# Patient Record
Sex: FEMALE | Race: BLACK OR AFRICAN AMERICAN | NOT HISPANIC OR LATINO | URBAN - METROPOLITAN AREA
[De-identification: names, ages, dates, MRNs, and addresses within clinical notes are randomized per-mention and may not be internally consistent; named-entity substitution may affect disease eponyms.]

---

## 2024-04-10 ENCOUNTER — EMERGENCY (EMERGENCY)
Facility: HOSPITAL | Age: 39
LOS: 1 days | Discharge: HOME | End: 2024-04-10
Admitting: EMERGENCY MEDICINE
Payer: SELF-PAY

## 2024-04-10 VITALS
RESPIRATION RATE: 16 BRPM | HEART RATE: 103 BPM | HEIGHT: 67 IN | DIASTOLIC BLOOD PRESSURE: 77 MMHG | TEMPERATURE: 98 F | WEIGHT: 293 LBS | OXYGEN SATURATION: 94 % | SYSTOLIC BLOOD PRESSURE: 110 MMHG

## 2024-04-10 PROCEDURE — 36600 WITHDRAWAL OF ARTERIAL BLOOD: CPT

## 2024-04-10 PROCEDURE — 99284 EMERGENCY DEPT VISIT MOD MDM: CPT | Mod: 25

## 2024-04-10 PROCEDURE — 99053 MED SERV 10PM-8AM 24 HR FAC: CPT

## 2024-04-10 RX ORDER — SODIUM CHLORIDE 9 MG/ML
1000 INJECTION INTRAMUSCULAR; INTRAVENOUS; SUBCUTANEOUS ONCE
Refills: 0 | Status: DISCONTINUED | OUTPATIENT
Start: 2024-04-10 | End: 2024-04-11

## 2024-04-10 RX ORDER — KETOROLAC TROMETHAMINE 30 MG/ML
15 SYRINGE (ML) INJECTION ONCE
Refills: 0 | Status: DISCONTINUED | OUTPATIENT
Start: 2024-04-10 | End: 2024-04-11

## 2024-04-10 NOTE — ED ADULT TRIAGE NOTE - TEMPERATURE IN FAHRENHEIT (DEGREES F)
INSURANCE HAS DENIED THE LIDOCAINE  PROBABLY DUE TO THE DX  BEFORE I CALL PT IS THERE ANYTHING YOU WANT ME TO TELL HER? 97.8

## 2024-04-10 NOTE — ED ADULT TRIAGE NOTE - CHIEF COMPLAINT QUOTE
Pt walked in c/o sickle cell pain. States pain is generalized, takes Dilaudid 10mg PO last dose 4pm.

## 2024-04-11 VITALS
DIASTOLIC BLOOD PRESSURE: 77 MMHG | SYSTOLIC BLOOD PRESSURE: 100 MMHG | RESPIRATION RATE: 16 BRPM | HEART RATE: 98 BPM | OXYGEN SATURATION: 95 % | TEMPERATURE: 98 F

## 2024-04-11 LAB
ALBUMIN SERPL ELPH-MCNC: 2.7 G/DL — LOW (ref 3.4–5)
ALP SERPL-CCNC: 117 U/L — SIGNIFICANT CHANGE UP (ref 40–120)
ALT FLD-CCNC: 29 U/L — SIGNIFICANT CHANGE UP (ref 12–42)
ANION GAP SERPL CALC-SCNC: 15 MMOL/L — SIGNIFICANT CHANGE UP (ref 9–16)
AST SERPL-CCNC: 29 U/L — SIGNIFICANT CHANGE UP (ref 15–37)
BASOPHILS # BLD AUTO: 0.05 K/UL — SIGNIFICANT CHANGE UP (ref 0–0.2)
BASOPHILS NFR BLD AUTO: 0.5 % — SIGNIFICANT CHANGE UP (ref 0–2)
BILIRUB SERPL-MCNC: 0.3 MG/DL — SIGNIFICANT CHANGE UP (ref 0.2–1.2)
BUN SERPL-MCNC: 30 MG/DL — HIGH (ref 7–23)
CALCIUM SERPL-MCNC: 8.8 MG/DL — SIGNIFICANT CHANGE UP (ref 8.5–10.5)
CHLORIDE SERPL-SCNC: 102 MMOL/L — SIGNIFICANT CHANGE UP (ref 96–108)
CO2 SERPL-SCNC: 20 MMOL/L — LOW (ref 22–31)
CREAT SERPL-MCNC: 2.81 MG/DL — HIGH (ref 0.5–1.3)
EGFR: 21 ML/MIN/1.73M2 — LOW
EOSINOPHIL # BLD AUTO: 0.12 K/UL — SIGNIFICANT CHANGE UP (ref 0–0.5)
EOSINOPHIL NFR BLD AUTO: 1.2 % — SIGNIFICANT CHANGE UP (ref 0–6)
GLUCOSE SERPL-MCNC: 246 MG/DL — HIGH (ref 70–99)
HCT VFR BLD CALC: 30.2 % — LOW (ref 34.5–45)
HGB BLD-MCNC: 9.4 G/DL — LOW (ref 11.5–15.5)
IMM GRANULOCYTES NFR BLD AUTO: 0.4 % — SIGNIFICANT CHANGE UP (ref 0–0.9)
LYMPHOCYTES # BLD AUTO: 2.96 K/UL — SIGNIFICANT CHANGE UP (ref 1–3.3)
LYMPHOCYTES # BLD AUTO: 30.2 % — SIGNIFICANT CHANGE UP (ref 13–44)
MAGNESIUM SERPL-MCNC: 2.1 MG/DL — SIGNIFICANT CHANGE UP (ref 1.6–2.6)
MCHC RBC-ENTMCNC: 27.2 PG — SIGNIFICANT CHANGE UP (ref 27–34)
MCHC RBC-ENTMCNC: 31.1 GM/DL — LOW (ref 32–36)
MCV RBC AUTO: 87.3 FL — SIGNIFICANT CHANGE UP (ref 80–100)
MONOCYTES # BLD AUTO: 0.77 K/UL — SIGNIFICANT CHANGE UP (ref 0–0.9)
MONOCYTES NFR BLD AUTO: 7.9 % — SIGNIFICANT CHANGE UP (ref 2–14)
NEUTROPHILS # BLD AUTO: 5.86 K/UL — SIGNIFICANT CHANGE UP (ref 1.8–7.4)
NEUTROPHILS NFR BLD AUTO: 59.8 % — SIGNIFICANT CHANGE UP (ref 43–77)
NRBC # BLD: 0 /100 WBCS — SIGNIFICANT CHANGE UP (ref 0–0)
PLATELET # BLD AUTO: 402 K/UL — HIGH (ref 150–400)
POTASSIUM SERPL-MCNC: 4.3 MMOL/L — SIGNIFICANT CHANGE UP (ref 3.5–5.3)
POTASSIUM SERPL-SCNC: 4.3 MMOL/L — SIGNIFICANT CHANGE UP (ref 3.5–5.3)
PROT SERPL-MCNC: 8.5 G/DL — HIGH (ref 6.4–8.2)
RBC # BLD: 3.46 M/UL — LOW (ref 3.8–5.2)
RBC # BLD: 3.46 M/UL — LOW (ref 3.8–5.2)
RBC # FLD: 16.1 % — HIGH (ref 10.3–14.5)
RETICS #: 115.2 K/UL — SIGNIFICANT CHANGE UP (ref 25–125)
RETICS/RBC NFR: 3.3 % — HIGH (ref 0.5–2.5)
SICKLE CELL DISEASE SCREENING TEST: NEGATIVE — SIGNIFICANT CHANGE UP
SODIUM SERPL-SCNC: 137 MMOL/L — SIGNIFICANT CHANGE UP (ref 132–145)
WBC # BLD: 9.8 K/UL — SIGNIFICANT CHANGE UP (ref 3.8–10.5)
WBC # FLD AUTO: 9.8 K/UL — SIGNIFICANT CHANGE UP (ref 3.8–10.5)

## 2024-04-11 RX ORDER — HYDROMORPHONE HYDROCHLORIDE 2 MG/ML
4 INJECTION INTRAMUSCULAR; INTRAVENOUS; SUBCUTANEOUS ONCE
Refills: 0 | Status: DISCONTINUED | OUTPATIENT
Start: 2024-04-11 | End: 2024-04-11

## 2024-04-11 RX ADMIN — HYDROMORPHONE HYDROCHLORIDE 4 MILLIGRAM(S): 2 INJECTION INTRAMUSCULAR; INTRAVENOUS; SUBCUTANEOUS at 17:00

## 2024-04-11 RX ADMIN — HYDROMORPHONE HYDROCHLORIDE 4 MILLIGRAM(S): 2 INJECTION INTRAMUSCULAR; INTRAVENOUS; SUBCUTANEOUS at 01:38

## 2024-04-11 NOTE — ED PROVIDER NOTE - PROGRESS NOTE DETAILS
pt falling asleep in stretcher at bedside, NAD or respiratory distress noted. refused to provide urine and have IV placed currently. Labs noted, consistent with pt's baseline, minimally elevated retic count, H/H stable, no indicated for transfusion or acute intervention

## 2024-04-11 NOTE — ED PROCEDURE NOTE - CPROC ED ARTER LINE DETAIL1
Using sterile technique, the correct location was identified, and a needle was inserted into the artery (specify in FT)./Positive blood return was obtained via the catheter./Hemostasis was achieved with direct pressure, and a dry sterile dressing applied.

## 2024-04-11 NOTE — ED PROVIDER NOTE - NSICDXPASTMEDICALHX_GEN_ALL_CORE_FT
PAST MEDICAL HISTORY:  Sickle cell disease      PAST MEDICAL HISTORY:  Anemia     CKD (chronic kidney disease)     Sickle cell disease

## 2024-04-11 NOTE — ED ADULT NURSE NOTE - OBJECTIVE STATEMENT
Pt in ED d/t sickle cell pain. Pt states she takes dilaudid at home, last dosage taken at 4p. C/o of generalized body aches. AOx4, GCS 15.

## 2024-04-11 NOTE — ED PROVIDER NOTE - NSFOLLOWUPINSTRUCTIONS_ED_ALL_ED_FT
Sickle Cell Crisis    AMBULATORY CARE:    A sickle cell crisis is a painful episode that occurs in people who have sickle cell anemia. It happens when sickle-shaped red blood cells (RBCs) block blood vessels. Blood and oxygen cannot get to your tissues, causing pain. A sickle cell crisis can also damage your tissues and cause organ failure, such liver or kidney failure. A sickle cell crisis can become life-threatening.    Common symptoms include the following:    Fever    Pain    Weakness or fatigue    Abdominal pain and swelling    Headaches    A painful, erect penis (priapism)    Fast heartbeats    Shortness of breath  Call your local emergency number (911 in the ) if:    You have shortness of breath or chest pain.    You are a man and have an erection that is painful and does not go away.    You lose vision in one or both eyes.  Seek care immediately if:    You feel like you cannot cope with your pain, or you feel like hurting yourself.    You have behavior changes, a seizure, or faint.    You have a fever.    You have abdominal pain, nausea, or vomiting.    You have a different or worse headache.    You have new weakness or numbness in your arm, leg, or face.    You have new pain in any part of your body.    Your urine is dark and you are urinating less than usual or not at all.    You are dizzy, lightheaded, or faint.  Call your doctor if:    You have any new signs or symptoms.    You have blood in your urine.    You are constipated or you have diarrhea.    You have changes in your vision.    You have increased fatigue.    You plan to travel by airplane or to a high [facility].    You have questions or concerns about your condition or care.  Medical alert identification: Wear medical alert jewelry or carry a card that says you have sickle cell anemia. Ask your healthcare provider where to get these items.  Medical Alert Jewelry    Treatment for a sickle cell crisis may include any of the following:    IV fluids treat dehydration and help reduce sickling of RBCs.    Oxygen helps increase oxygen levels in your blood and make it easier for you to breathe.    A blood transfusion replaces blood with RBCs that are not sickle shaped.    Surgery may be done to remove part of your spleen.  Self-care:    Medicines may be given to decrease sickling of your RBCs. You may also need medicine to prevent a bacterial infection or help you breathe more easily.    Prescription pain medicine may be given. Ask your healthcare provider how to take this medicine safely. Some prescription pain medicines contain acetaminophen. Do not take other medicines that contain acetaminophen without talking to your healthcare provider. Too much acetaminophen may cause liver damage. Prescription pain medicine may cause constipation. Ask your healthcare provider how to prevent or treat constipation.    NSAIDs, such as ibuprofen, help decrease swelling, pain, and fever. This medicine is available with or without a doctor's order. NSAIDs can cause stomach bleeding or kidney problems in certain people. If you take blood thinner medicine, always ask your healthcare provider if NSAIDs are safe for you. Always read the medicine label and follow directions.    Acetaminophen decreases pain and fever. It is available without a doctor's order. Ask how much to take and how often to take it. Follow directions. Read the labels of all other medicines you are using to see if they also contain acetaminophen, or ask your doctor or pharmacist. Acetaminophen can cause liver damage if not taken correctly.  Prevent a sickle cell crisis:    Take vitamins and minerals as directed. Folic acid may help prevent blood vessel problems that can come with sickle cell anemia. Zinc may decrease how often you have pain.    Drink liquids as directed. Dehydration can increase your risk for a sick cell crisis. Ask how much liquid to drink each day and which liquids are best for you.    Balance rest and exercise. Rest during a sickle cell crisis. Over time, increase your activity to a moderate amount. Exercise as directed. Avoid exercise or activities that can cause injury, such as football. Ask about the best exercise plan for you.    Wash your hands frequently. Handwashing can help prevent illness. Wash your hands before you prepare or eat food, and after you use the bathroom.  Handwashing      Avoid quick changes in temperature. Do not go quickly from a warm place to a cold place. Get in a pool slowly instead of jumping in. Avoid getting too hot or too cold. Dress in light clothing in the summer and warm clothing in the winter.    Do not smoke cigarettes or drink alcohol. These increase your risk for a sickle cell crisis. Nicotine and other chemicals in cigarettes and cigars can cause lung damage. Ask your healthcare provider for information if you currently smoke and need help to quit. E-cigarettes or smokeless tobacco still contain nicotine. Talk to your healthcare provider before you use these products.    Ask about vaccines you may need. Vaccines can help prevent a viral infection that may lead to a sickle cell crisis. Get a flu shot as soon as recommended each year, usually in September or October. You may need pneumonia vaccines every 5 years. Your healthcare provider can tell you if you need other vaccines, and when to get them.  Follow up with your doctor as directed: You may need ongoing screening for conditions that can develop because of sickle cell disease. Examples include kidney disease, hypertension (high blood pressure), retinopathy (eye problems), and problems with your lungs. Write down your questions so you remember to ask them during your visits

## 2024-04-11 NOTE — ED PROVIDER NOTE - PATIENT PORTAL LINK FT
You can access the FollowMyHealth Patient Portal offered by [facility] by registering at the following website: [email or web address]. By joining [employer]’s FollowMyHealth portal, you will also be able to view your health information using other applications (apps) compatible with our system.

## 2024-04-11 NOTE — ED PROVIDER NOTE - CARE PROVIDERS DIRECT ADDRESSES
,DirectAddress_Unknown,[email or web address]

## 2024-04-11 NOTE — ED PROVIDER NOTE - PROVIDER TOKENS
FREE:[LAST:[your private hematologist],PHONE:[(   )    -],FAX:[(   )    -]],PROVIDER:[TOKEN:[[id number]]],PROVIDER:[TOKEN:[[id number]]]

## 2024-04-11 NOTE — ED PROVIDER NOTE - OBJECTIVE STATEMENT
39 yo F with past medical history of sickle cell disease, reports taking 8 to 10 mg of Dilaudid p.o. at home every 8 hours as needed for pain, follows at [place] with her private hematologist, here visiting for a  at [facility], baseline H&H , last transfusion was more than 1 year ago, no history of acute chest syndrome/exchange transfusion, presenting complaining of pain in her lower back, hips, and legs.  Patient reports pain consistent with her sickle cell pain, took her p.o. Dilaudid but did not help, no in pain crisis x 2 hours. Denies fever, chills, palpitations, diaphoresis, VELEZ, SOB, orthopnea, cough, hemoptysis, wheezing, peripheral edema, focal weakness, numbness, tingling, paresthesia, HA, dizziness, neck pain, N/V/D/C, abdominal pain, change in urinary/bowel function, trauma, fall, rash, and malaise. 37 yo F with past medical history of sickle cell disease, reports taking 8 to 10 mg of Dilaudid p.o. at home every 8 hours as needed for pain, follows at [place] with her private hematologist, here visiting for a  in [facility], baseline H&H , last transfusion was more than 1 year ago, CKD, baseline Cr unknown, no history of acute chest syndrome/exchange transfusion/AVN, s/p removal of R chest wall port due to line infection 2 yrs ago, presenting complaining of pain in her lower back, hips, and legs.  Patient reports pain consistent with her sickle cell pain, took her p.o. Dilaudid but did not help, now in pain crisis x 2 hours. Denies fever, chills, palpitations, diaphoresis, VELEZ, SOB, orthopnea, cough, hemoptysis, wheezing, peripheral edema, focal weakness, numbness, tingling, paresthesia, HA, dizziness, neck pain, N/V/D/C, abdominal pain, change in urinary/bowel function, trauma, fall, rash, and malaise.

## 2024-04-11 NOTE — ED PROVIDER NOTE - PHYSICAL EXAMINATION
Gen - Obese unkempt F, NAD, sleeping in stretcher, and non-toxic appearing  Skin - warm, dry, intact, multiple scarring of the BUE and chest wall, s/p removal of her port on the R chest wall   HEENT - AT/NC, no nasal discharge, airway patent, neck supple and FROM  CV - S1S2, R/R/R  Resp - CTAB, no r/r/w  GI - soft, ND, NT, no CVAT b/l   MS - w/w/p, no c/c/e, calves supple and NT, No acute or gross deformities noted to extremities. No midline spinal tenderness or step off on palpation  Neuro - AxOx3, no focal neuro deficits, ambulatory without gait disturbance

## 2024-04-11 NOTE — ED PROVIDER NOTE - CLINICAL SUMMARY MEDICAL DECISION MAKING FREE TEXT BOX
39 yo F with past medical history of sickle cell disease, reports taking 8 to 10 mg of Dilaudid p.o. at home every 8 hours as needed for pain, follows at [place] with her private hematologist, [facility], baseline H&H , last transfusion was more than 1 year ago, CKD, baseline Cr unknown, no history of acute chest syndrome/exchange transfusion/AVN, s/p removal of R chest wall port due to line infection 2 yrs ago, presenting complaining of pain in her lower back, hips, and legs x 2 hrs       Based on my personal interpretation of pt's labs/images and entire work up during the ED stay, results, ddx, and f/u plans discussed in length with pt at bedside. AFVSS, pt non-toxic appearing and remained stable throughout the stay after ED interventions. D/c'd home to f/u with hematologist, strict return precautions discussed, prompt return to ED for any worsening or new sx, pt verbalized understanding. 37 yo F with past medical history of sickle cell disease, reports taking 8 to 10 mg of Dilaudid p.o. at home every 8 hours as needed for pain, follows at [place] with her private hematologist, here visiting for a  in [facility], baseline H&H , last transfusion was more than 1 year ago, CKD, baseline Cr unknown, no history of acute chest syndrome/exchange transfusion/AVN, s/p removal of R chest wall port due to line infection 2 yrs ago, presenting complaining of pain in her lower back, hips, and legs x 2 hrs that's typical of her SCC pain   - will obtain medical labs, including retic count and SCC panel, r/o infectious etiology  - pain control and IV hydration and reassess     - pt with difficult peripheral access, s/p chest wall port removal 2 yrs ago, refused IV by RN and provider. blood draw obtained by myself at bedside via Arterial stick. Given po hydration, and IM dilaudid for pain control     - pt observed comfortable throughout ER stay, texting on her phone and sleeping comfortably on stretcher, no O2 requirement, no s/s of acute bacterial infection on exam, sx and labs not suggestive of ACS/other acute vaso-occlusive process/VTE, encouraged continual on hydroxyurea and prompt f/u with private hematologist in [place]. No indication for additional rounds of IV/IM analgesics. Pt has po dilaudid otherwise and refused to provide urine throughout ER stay for additional testing.     Based on my personal interpretation of pt's labs/images and entire work up during the ED stay, results, ddx, and f/u plans discussed in length with pt at bedside. AFVSS, pt non-toxic appearing and remained stable throughout the stay after ED interventions. D/c'd home to f/u with hematologist, strict return precautions discussed, prompt return to ED for any worsening or new sx, pt verbalized understanding.

## 2024-04-11 NOTE — ED PROVIDER NOTE - CARE PROVIDER_API CALL
your private hematologist,   Phone: (   )    -  Fax: (   )    -  Follow Up Time:     Bebe Salvador  Hematology  [address]  [place] [address]  Phone: [phone]  Fax: [phone]  Follow Up Time:     Esme Ferrer  Physical/Rehab Medicine  [address] [place] [address]  Phone: [phone]  Fax: [phone]  Follow Up Time:

## 2024-04-12 ENCOUNTER — EMERGENCY (EMERGENCY)
Facility: HOSPITAL | Age: 39
LOS: 1 days | Discharge: HOME | End: 2024-04-12
Attending: EMERGENCY MEDICINE | Admitting: EMERGENCY MEDICINE
Payer: SELF-PAY

## 2024-04-12 VITALS
OXYGEN SATURATION: 96 % | HEART RATE: 93 BPM | RESPIRATION RATE: 16 BRPM | SYSTOLIC BLOOD PRESSURE: 99 MMHG | TEMPERATURE: 97 F | DIASTOLIC BLOOD PRESSURE: 69 MMHG

## 2024-04-12 VITALS
OXYGEN SATURATION: 98 % | TEMPERATURE: 99 F | HEIGHT: 67 IN | HEART RATE: 96 BPM | DIASTOLIC BLOOD PRESSURE: 93 MMHG | RESPIRATION RATE: 19 BRPM | WEIGHT: 293 LBS | SYSTOLIC BLOOD PRESSURE: 142 MMHG

## 2024-04-12 DIAGNOSIS — E66.9 OBESITY, UNSPECIFIED: ICD-10-CM

## 2024-04-12 DIAGNOSIS — N18.9 CHRONIC KIDNEY DISEASE, UNSPECIFIED: ICD-10-CM

## 2024-04-12 DIAGNOSIS — D57.00 HB-SS DISEASE WITH CRISIS, UNSPECIFIED: ICD-10-CM

## 2024-04-12 DIAGNOSIS — R46.0 VERY LOW LEVEL OF PERSONAL HYGIENE: ICD-10-CM

## 2024-04-12 DIAGNOSIS — M54.50 LOW BACK PAIN, UNSPECIFIED: ICD-10-CM

## 2024-04-12 DIAGNOSIS — Z87.891 PERSONAL HISTORY OF NICOTINE DEPENDENCE: ICD-10-CM

## 2024-04-12 PROBLEM — D64.9 ANEMIA, UNSPECIFIED: Chronic | Status: ACTIVE | Noted: 2024-04-11

## 2024-04-12 PROBLEM — D57.1 SICKLE-CELL DISEASE WITHOUT CRISIS: Chronic | Status: ACTIVE | Noted: 2024-04-11

## 2024-04-12 LAB
ANION GAP SERPL CALC-SCNC: 11 MMOL/L — SIGNIFICANT CHANGE UP (ref 9–16)
BASOPHILS # BLD AUTO: 0.03 K/UL — SIGNIFICANT CHANGE UP (ref 0–0.2)
BASOPHILS NFR BLD AUTO: 0.4 % — SIGNIFICANT CHANGE UP (ref 0–2)
BUN SERPL-MCNC: 31 MG/DL — HIGH (ref 7–23)
CALCIUM SERPL-MCNC: 8.2 MG/DL — LOW (ref 8.5–10.5)
CHLORIDE SERPL-SCNC: 101 MMOL/L — SIGNIFICANT CHANGE UP (ref 96–108)
CO2 SERPL-SCNC: 24 MMOL/L — SIGNIFICANT CHANGE UP (ref 22–31)
CREAT SERPL-MCNC: 2.14 MG/DL — HIGH (ref 0.5–1.3)
EGFR: 30 ML/MIN/1.73M2 — LOW
EOSINOPHIL # BLD AUTO: 0.1 K/UL — SIGNIFICANT CHANGE UP (ref 0–0.5)
EOSINOPHIL NFR BLD AUTO: 1.4 % — SIGNIFICANT CHANGE UP (ref 0–6)
GLUCOSE BLDC GLUCOMTR-MCNC: 311 MG/DL — HIGH (ref 70–99)
GLUCOSE BLDC GLUCOMTR-MCNC: 441 MG/DL — HIGH (ref 70–99)
GLUCOSE SERPL-MCNC: 320 MG/DL — HIGH (ref 70–99)
HCT VFR BLD CALC: 30.9 % — LOW (ref 34.5–45)
HGB BLD-MCNC: 9.6 G/DL — LOW (ref 11.5–15.5)
IMM GRANULOCYTES NFR BLD AUTO: 0.1 % — SIGNIFICANT CHANGE UP (ref 0–0.9)
LYMPHOCYTES # BLD AUTO: 2.76 K/UL — SIGNIFICANT CHANGE UP (ref 1–3.3)
LYMPHOCYTES # BLD AUTO: 37.5 % — SIGNIFICANT CHANGE UP (ref 13–44)
MCHC RBC-ENTMCNC: 26.7 PG — LOW (ref 27–34)
MCHC RBC-ENTMCNC: 31.1 GM/DL — LOW (ref 32–36)
MCV RBC AUTO: 86.1 FL — SIGNIFICANT CHANGE UP (ref 80–100)
MONOCYTES # BLD AUTO: 0.59 K/UL — SIGNIFICANT CHANGE UP (ref 0–0.9)
MONOCYTES NFR BLD AUTO: 8 % — SIGNIFICANT CHANGE UP (ref 2–14)
NEUTROPHILS # BLD AUTO: 3.87 K/UL — SIGNIFICANT CHANGE UP (ref 1.8–7.4)
NEUTROPHILS NFR BLD AUTO: 52.6 % — SIGNIFICANT CHANGE UP (ref 43–77)
NRBC # BLD: 0 /100 WBCS — SIGNIFICANT CHANGE UP (ref 0–0)
PLATELET # BLD AUTO: 382 K/UL — SIGNIFICANT CHANGE UP (ref 150–400)
POTASSIUM SERPL-MCNC: 3.9 MMOL/L — SIGNIFICANT CHANGE UP (ref 3.5–5.3)
POTASSIUM SERPL-SCNC: 3.9 MMOL/L — SIGNIFICANT CHANGE UP (ref 3.5–5.3)
RBC # BLD: 3.59 M/UL — LOW (ref 3.8–5.2)
RBC # BLD: 3.59 M/UL — LOW (ref 3.8–5.2)
RBC # FLD: 16.1 % — HIGH (ref 10.3–14.5)
RETICS #: 131.8 K/UL — HIGH (ref 25–125)
RETICS/RBC NFR: 3.7 % — HIGH (ref 0.5–2.5)
SODIUM SERPL-SCNC: 136 MMOL/L — SIGNIFICANT CHANGE UP (ref 132–145)
WBC # BLD: 7.36 K/UL — SIGNIFICANT CHANGE UP (ref 3.8–10.5)
WBC # FLD AUTO: 7.36 K/UL — SIGNIFICANT CHANGE UP (ref 3.8–10.5)

## 2024-04-12 PROCEDURE — 99284 EMERGENCY DEPT VISIT MOD MDM: CPT

## 2024-04-12 RX ORDER — DIPHENHYDRAMINE HCL 50 MG
25 CAPSULE ORAL ONCE
Refills: 0 | Status: COMPLETED | OUTPATIENT
Start: 2024-04-12 | End: 2024-04-12

## 2024-04-12 RX ORDER — INSULIN HUMAN 100 [IU]/ML
10 INJECTION, SOLUTION SUBCUTANEOUS ONCE
Refills: 0 | Status: COMPLETED | OUTPATIENT
Start: 2024-04-12 | End: 2024-04-12

## 2024-04-12 RX ORDER — INSULIN HUMAN 100 [IU]/ML
4 INJECTION, SOLUTION SUBCUTANEOUS ONCE
Refills: 0 | Status: COMPLETED | OUTPATIENT
Start: 2024-04-12 | End: 2024-04-12

## 2024-04-12 RX ORDER — HYDROMORPHONE HYDROCHLORIDE 2 MG/ML
6 INJECTION INTRAMUSCULAR; INTRAVENOUS; SUBCUTANEOUS ONCE
Refills: 0 | Status: DISCONTINUED | OUTPATIENT
Start: 2024-04-12 | End: 2024-04-12

## 2024-04-12 RX ORDER — HYDROMORPHONE HYDROCHLORIDE 2 MG/ML
8 INJECTION INTRAMUSCULAR; INTRAVENOUS; SUBCUTANEOUS ONCE
Refills: 0 | Status: DISCONTINUED | OUTPATIENT
Start: 2024-04-12 | End: 2024-04-12

## 2024-04-12 RX ADMIN — INSULIN HUMAN 10 UNIT(S): 100 INJECTION, SOLUTION SUBCUTANEOUS at 18:54

## 2024-04-12 RX ADMIN — HYDROMORPHONE HYDROCHLORIDE 6 MILLIGRAM(S): 2 INJECTION INTRAMUSCULAR; INTRAVENOUS; SUBCUTANEOUS at 17:32

## 2024-04-12 RX ADMIN — INSULIN HUMAN 4 UNIT(S): 100 INJECTION, SOLUTION SUBCUTANEOUS at 17:35

## 2024-04-12 RX ADMIN — HYDROMORPHONE HYDROCHLORIDE 8 MILLIGRAM(S): 2 INJECTION INTRAMUSCULAR; INTRAVENOUS; SUBCUTANEOUS at 18:52

## 2024-04-12 RX ADMIN — HYDROMORPHONE HYDROCHLORIDE 6 MILLIGRAM(S): 2 INJECTION INTRAMUSCULAR; INTRAVENOUS; SUBCUTANEOUS at 16:19

## 2024-04-12 RX ADMIN — HYDROMORPHONE HYDROCHLORIDE 6 MILLIGRAM(S): 2 INJECTION INTRAMUSCULAR; INTRAVENOUS; SUBCUTANEOUS at 15:02

## 2024-04-12 RX ADMIN — Medication 25 MILLIGRAM(S): at 15:04

## 2024-04-12 NOTE — ED PROVIDER NOTE - PATIENT PORTAL LINK FT
You can access the FollowMyHealth Patient Portal offered by [facility] by registering at the following website: [email or web address]. By joining [employer]’s FollowMyHealth portal, you will also be able to view your health information using other applications (apps) compatible with our system.

## 2024-04-12 NOTE — ED PROVIDER NOTE - PROGRESS NOTE DETAILS
Patient reports no relief from first dose of dilaudid. Will repeat dose. Pain still severe. Patient requesting 3rd dose but not admission. Also requesting food. Glucose high. Will give insulin with food. Glucose improved. Patient will follow up with her hematologist next week. Return to the ED immediately if getting worse, not improving, or if having any new or troubling symptoms. Glucose 411. WIll give more insulin. I offered US guided IV for hydration. Patient refused. WIll drink water instead. Patient had been drinking soda. Instructed to only drink water. Patient said her glucose goes high when she has a pain crisis.

## 2024-04-12 NOTE — ED PROVIDER NOTE - PHYSICAL EXAMINATION
Gen: Well-developed, well-nourished, NAD, VS as noted by nursing. HEENT: NCAT, mmm   Chest: RRR, nl S1 and S2, no m/r/g. Resp: CTAB, no w/r/r  Abd: nl BS, soft, nt/nd. Ext: Warm, dry. Diffuse tenderness to low back and lower extremities.  Neuro: CN II-XII intact, normal and equal strength, sensation, and reflexes bilaterally, normal gait  Psych: AAOx3

## 2024-04-12 NOTE — ED PROVIDER NOTE - OBJECTIVE STATEMENT
Patient seen here 2 days ago for sickle cell pain crisis, discharged after 1 dose of dilaudid. Reports pain continued and is gradually worsening. Pain is from low back and both legs.  Feels like her typical pain crisis.  No fever, chest pain, shortness of breath, cough, abdominal pain, nausea, vomiting, diarrhea patient reports decreased food intake but drinking good fluids.  Patient has history of SHERWIN whenever she gets a pain crisis.  Creatinine usually returns to normal after the crisis has ended. Lives in [place], [place]. Is in [facility] due to the death of a family member. Takes dilaudid 8-10mg PO for pain crisis at home but has not been working. States she usually gets 4-6mg IM in the ED. Has h/o poor venous access. Had a port that was removed due to infection.

## 2024-04-12 NOTE — ED ADULT NURSE NOTE - OBJECTIVE STATEMENT
pt c/o sickle cell pain. Was here yesterday for same thing. Took PO dilaudid at 6 AM with no relief.

## 2024-04-12 NOTE — ED ADULT NURSE NOTE - NSFALLUNIVINTERV_ED_ALL_ED
Bed/Stretcher in lowest position, wheels locked, appropriate side rails in place/Call bell, personal items and telephone in reach/Instruct patient to call for assistance before getting out of bed/chair/stretcher/Non-slip footwear applied when patient is off stretcher/[place] to call system/Physically safe environment - no spills, clutter or unnecessary equipment/Purposeful proactive rounding/Room/bathroom lighting operational, light cord in reach

## 2024-04-12 NOTE — ED PROVIDER NOTE - CLINICAL SUMMARY MEDICAL DECISION MAKING FREE TEXT BOX
Patient with sickle cell pain crisis. No evidence of infection, acute chest, or other complication. WIll check labs. Concern for worsening SHERWIN.

## 2024-04-12 NOTE — ED PROVIDER NOTE - NSFOLLOWUPINSTRUCTIONS_ED_ALL_ED_FT
Sickle Cell Crisis    WHAT YOU NEED TO KNOW:    A sickle cell crisis is a painful episode that occurs in people who have sickle cell anemia. It happens when sickle-shaped red blood cells (RBCs) block blood vessels. Blood and oxygen cannot get to tissues, causing pain. A sickle cell crisis can also damage your tissues and cause organ failure, such liver or kidney failure. A sickle cell crisis can become life-threatening.    DISCHARGE INSTRUCTIONS:    Call your local emergency number (911 in the ) if:    You have shortness of breath or chest pain.    You are a man and have an erection that is painful and does not go away.    You lose vision in one or both eyes.  Return to the emergency department if:    You have a fever.    You feel like you cannot cope with your pain, or you feel like hurting yourself.    You have behavior changes, a seizure, or faint.    You have abdominal pain, nausea, or vomiting.    You have a headache that is worse or different from those that you have had in the past.    You have new weakness or numbness in your arm, leg, or face.    You have new pain in any part of your body.    Your urine is dark and you are urinating less than usual or not at all.    You are dizzy, lightheaded, or faint.  Call your doctor if:    You have any new signs or symptoms.    You have blood in your urine.    You are constipated or you have diarrhea.    You have changes in your vision.    You have increased fatigue.    You plan to travel by airplane or to a high elevation.    You have questions or concerns about your condition or care.  Medicines: You may need any of the following:    Medicine may be given to decrease sickling of your RBCs. You may also need medicine to treat or prevent a bacterial infection.    Prescription pain medicine may be given. Ask your healthcare provider how to take this medicine safely. Some prescription pain medicines contain acetaminophen. Do not take other medicines that contain acetaminophen without talking to your healthcare provider. Too much acetaminophen may cause liver damage. Prescription pain medicine may cause constipation. Ask your healthcare provider how to prevent or treat constipation.    NSAIDs, such as ibuprofen, help decrease swelling, pain, and fever. This medicine is available with or without a doctor's order. NSAIDs can cause stomach bleeding or kidney problems in certain people. If you take blood thinner medicine, always ask your healthcare provider if NSAIDs are safe for you. Always read the medicine label and follow directions.    Acetaminophen decreases pain and fever. It is available without a doctor's order. Ask how much to take and how often to take it. Follow directions. Read the labels of all other medicines you are using to see if they also contain acetaminophen, or ask your doctor or pharmacist. Acetaminophen can cause liver damage if not taken correctly.    Take your medicine as directed. Contact your healthcare provider if you think your medicine is not helping or if you have side effects. Tell your provider if you are allergic to any medicine. Keep a list of the medicines, vitamins, and herbs you take. Include the amounts, and when and why you take them. Bring the list or the pill bottles to follow-up visits. Carry your medicine list with you in case of an emergency.  Medical alert identification: Wear medical alert jewelry or carry a card that says you have sickle cell anemia. Ask your healthcare provider where to get these items.  Medical Alert Jewelry    Prevent a sickle cell crisis:    Take vitamins and minerals as directed. Folic acid may help prevent blood vessel problems that can occur with sickle cell anemia. Zinc may decrease how often you have pain.    Drink liquids as directed. Dehydration can increase your risk for a sickle cell crisis. Ask how much liquid to drink each day and which liquids are best for you.    Balance rest and exercise. Rest during a sickle cell crisis. Over time, increase your activity to a moderate amount. Exercise as directed. Avoid exercise or activities that can cause injury, such as football. Ask about the best exercise plan for you.    Wash your hands frequently. Handwashing can help prevent illness. Wash your hands before you prepare or eat food, and after you use the bathroom.  Handwashing      Avoid quick changes in temperature. Do not go quickly from a warm place to a cold place. Get in a pool slowly instead of jumping in. Avoid getting too hot or too cold. Dress in light clothing in the summer and warm clothing in the winter.    Do not smoke cigarettes or drink alcohol. These increase your risk for a sickle cell crisis. Nicotine and other chemicals in cigarettes and cigars can cause lung damage. Ask your healthcare provider for information if you currently smoke and need help to quit. E-cigarettes or smokeless tobacco still contain nicotine. Talk to your healthcare provider before you use these products.    Ask about vaccines you may need. Vaccines can help prevent a viral infection that may lead to a sickle cell crisis. Get a flu shot as soon as recommended each year, usually in September or October. You may need pneumonia vaccines every 5 years. Your healthcare provider can tell you if you need other vaccines, and when to get them.  Follow up with your doctor as directed: You may need ongoing screening for conditions that can develop because of sickle cell disease. Examples include kidney disease, hypertension (high blood pressure), retinopathy (eye problems), and problems with your lungs. Write down your questions so you remember to ask them during your visits.

## 2024-04-15 ENCOUNTER — INPATIENT (INPATIENT)
Facility: HOSPITAL | Age: 39
LOS: 2 days | Discharge: LEFT BEFORE DISCHARGE | End: 2024-04-18
Attending: STUDENT IN AN ORGANIZED HEALTH CARE EDUCATION/TRAINING PROGRAM | Admitting: INTERNAL MEDICINE
Payer: SELF-PAY

## 2024-04-15 VITALS
HEART RATE: 104 BPM | OXYGEN SATURATION: 95 % | TEMPERATURE: 99 F | HEIGHT: 67 IN | RESPIRATION RATE: 16 BRPM | WEIGHT: 293 LBS | DIASTOLIC BLOOD PRESSURE: 95 MMHG | SYSTOLIC BLOOD PRESSURE: 169 MMHG

## 2024-04-15 DIAGNOSIS — D57.00 HB-SS DISEASE WITH CRISIS, UNSPECIFIED: ICD-10-CM

## 2024-04-15 DIAGNOSIS — M54.9 DORSALGIA, UNSPECIFIED: ICD-10-CM

## 2024-04-15 LAB
ALBUMIN SERPL ELPH-MCNC: 3.1 G/DL — LOW (ref 3.4–5)
ALP SERPL-CCNC: 120 U/L — SIGNIFICANT CHANGE UP (ref 40–120)
ALT FLD-CCNC: 21 U/L — SIGNIFICANT CHANGE UP (ref 12–42)
ANION GAP SERPL CALC-SCNC: 9 MMOL/L — SIGNIFICANT CHANGE UP (ref 9–16)
AST SERPL-CCNC: 15 U/L — SIGNIFICANT CHANGE UP (ref 15–37)
BASOPHILS # BLD AUTO: 0.06 K/UL — SIGNIFICANT CHANGE UP (ref 0–0.2)
BASOPHILS NFR BLD AUTO: 0.7 % — SIGNIFICANT CHANGE UP (ref 0–2)
BILIRUB SERPL-MCNC: 0.2 MG/DL — SIGNIFICANT CHANGE UP (ref 0.2–1.2)
BUN SERPL-MCNC: 21 MG/DL — SIGNIFICANT CHANGE UP (ref 7–23)
CALCIUM SERPL-MCNC: 9.1 MG/DL — SIGNIFICANT CHANGE UP (ref 8.5–10.5)
CHLORIDE SERPL-SCNC: 102 MMOL/L — SIGNIFICANT CHANGE UP (ref 96–108)
CO2 SERPL-SCNC: 26 MMOL/L — SIGNIFICANT CHANGE UP (ref 22–31)
CREAT SERPL-MCNC: 1.97 MG/DL — HIGH (ref 0.5–1.3)
EGFR: 33 ML/MIN/1.73M2 — LOW
EOSINOPHIL # BLD AUTO: 0.06 K/UL — SIGNIFICANT CHANGE UP (ref 0–0.5)
EOSINOPHIL NFR BLD AUTO: 0.7 % — SIGNIFICANT CHANGE UP (ref 0–6)
GLUCOSE BLDC GLUCOMTR-MCNC: 315 MG/DL — HIGH (ref 70–99)
GLUCOSE BLDC GLUCOMTR-MCNC: 386 MG/DL — HIGH (ref 70–99)
GLUCOSE SERPL-MCNC: 408 MG/DL — HIGH (ref 70–99)
HCG SERPL-ACNC: <1 MIU/ML — SIGNIFICANT CHANGE UP
HCT VFR BLD CALC: 33.9 % — LOW (ref 34.5–45)
HGB BLD-MCNC: 10.3 G/DL — LOW (ref 11.5–15.5)
IMM GRANULOCYTES NFR BLD AUTO: 0.1 % — SIGNIFICANT CHANGE UP (ref 0–0.9)
LYMPHOCYTES # BLD AUTO: 2.64 K/UL — SIGNIFICANT CHANGE UP (ref 1–3.3)
LYMPHOCYTES # BLD AUTO: 28.7 % — SIGNIFICANT CHANGE UP (ref 13–44)
MCHC RBC-ENTMCNC: 26.4 PG — LOW (ref 27–34)
MCHC RBC-ENTMCNC: 30.4 GM/DL — LOW (ref 32–36)
MCV RBC AUTO: 86.9 FL — SIGNIFICANT CHANGE UP (ref 80–100)
MONOCYTES # BLD AUTO: 0.6 K/UL — SIGNIFICANT CHANGE UP (ref 0–0.9)
MONOCYTES NFR BLD AUTO: 6.5 % — SIGNIFICANT CHANGE UP (ref 2–14)
NEUTROPHILS # BLD AUTO: 5.84 K/UL — SIGNIFICANT CHANGE UP (ref 1.8–7.4)
NEUTROPHILS NFR BLD AUTO: 63.3 % — SIGNIFICANT CHANGE UP (ref 43–77)
NRBC # BLD: 0 /100 WBCS — SIGNIFICANT CHANGE UP (ref 0–0)
PLATELET # BLD AUTO: 427 K/UL — HIGH (ref 150–400)
POTASSIUM SERPL-MCNC: 3.9 MMOL/L — SIGNIFICANT CHANGE UP (ref 3.5–5.3)
POTASSIUM SERPL-SCNC: 3.9 MMOL/L — SIGNIFICANT CHANGE UP (ref 3.5–5.3)
PROT SERPL-MCNC: 9 G/DL — HIGH (ref 6.4–8.2)
RBC # BLD: 3.9 M/UL — SIGNIFICANT CHANGE UP (ref 3.8–5.2)
RBC # BLD: 3.9 M/UL — SIGNIFICANT CHANGE UP (ref 3.8–5.2)
RBC # FLD: 16.6 % — HIGH (ref 10.3–14.5)
RETICS #: 116.2 K/UL — SIGNIFICANT CHANGE UP (ref 25–125)
RETICS/RBC NFR: 3 % — HIGH (ref 0.5–2.5)
SODIUM SERPL-SCNC: 137 MMOL/L — SIGNIFICANT CHANGE UP (ref 132–145)
WBC # BLD: 9.21 K/UL — SIGNIFICANT CHANGE UP (ref 3.8–10.5)
WBC # FLD AUTO: 9.21 K/UL — SIGNIFICANT CHANGE UP (ref 3.8–10.5)

## 2024-04-15 PROCEDURE — 71045 X-RAY EXAM CHEST 1 VIEW: CPT | Mod: 26

## 2024-04-15 PROCEDURE — 99285 EMERGENCY DEPT VISIT HI MDM: CPT

## 2024-04-15 RX ORDER — HYDROMORPHONE HYDROCHLORIDE 2 MG/ML
4 INJECTION INTRAMUSCULAR; INTRAVENOUS; SUBCUTANEOUS ONCE
Refills: 0 | Status: DISCONTINUED | OUTPATIENT
Start: 2024-04-15 | End: 2024-04-15

## 2024-04-15 RX ORDER — HYDROMORPHONE HYDROCHLORIDE 2 MG/ML
6 INJECTION INTRAMUSCULAR; INTRAVENOUS; SUBCUTANEOUS ONCE
Refills: 0 | Status: DISCONTINUED | OUTPATIENT
Start: 2024-04-15 | End: 2024-04-15

## 2024-04-15 RX ORDER — ACETAMINOPHEN 500 MG
650 TABLET ORAL ONCE
Refills: 0 | Status: COMPLETED | OUTPATIENT
Start: 2024-04-15 | End: 2024-04-15

## 2024-04-15 RX ORDER — SODIUM CHLORIDE 9 MG/ML
1000 INJECTION INTRAMUSCULAR; INTRAVENOUS; SUBCUTANEOUS ONCE
Refills: 0 | Status: COMPLETED | OUTPATIENT
Start: 2024-04-15 | End: 2024-04-15

## 2024-04-15 RX ORDER — MORPHINE SULFATE 50 MG/1
4 CAPSULE, EXTENDED RELEASE ORAL ONCE
Refills: 0 | Status: DISCONTINUED | OUTPATIENT
Start: 2024-04-15 | End: 2024-04-15

## 2024-04-15 RX ORDER — ONDANSETRON 8 MG/1
4 TABLET, FILM COATED ORAL ONCE
Refills: 0 | Status: COMPLETED | OUTPATIENT
Start: 2024-04-15 | End: 2024-04-15

## 2024-04-15 RX ORDER — INSULIN HUMAN 100 [IU]/ML
10 INJECTION, SOLUTION SUBCUTANEOUS ONCE
Refills: 0 | Status: DISCONTINUED | OUTPATIENT
Start: 2024-04-15 | End: 2024-04-15

## 2024-04-15 RX ORDER — ONDANSETRON 8 MG/1
4 TABLET, FILM COATED ORAL ONCE
Refills: 0 | Status: DISCONTINUED | OUTPATIENT
Start: 2024-04-15 | End: 2024-04-15

## 2024-04-15 RX ORDER — HYDROMORPHONE HYDROCHLORIDE 2 MG/ML
2 INJECTION INTRAMUSCULAR; INTRAVENOUS; SUBCUTANEOUS ONCE
Refills: 0 | Status: DISCONTINUED | OUTPATIENT
Start: 2024-04-15 | End: 2024-04-15

## 2024-04-15 RX ORDER — INSULIN HUMAN 100 [IU]/ML
10 INJECTION, SOLUTION SUBCUTANEOUS ONCE
Refills: 0 | Status: COMPLETED | OUTPATIENT
Start: 2024-04-15 | End: 2024-04-15

## 2024-04-15 RX ADMIN — SODIUM CHLORIDE 1000 MILLILITER(S): 9 INJECTION INTRAMUSCULAR; INTRAVENOUS; SUBCUTANEOUS at 14:41

## 2024-04-15 RX ADMIN — HYDROMORPHONE HYDROCHLORIDE 4 MILLIGRAM(S): 2 INJECTION INTRAMUSCULAR; INTRAVENOUS; SUBCUTANEOUS at 16:25

## 2024-04-15 RX ADMIN — HYDROMORPHONE HYDROCHLORIDE 4 MILLIGRAM(S): 2 INJECTION INTRAMUSCULAR; INTRAVENOUS; SUBCUTANEOUS at 18:43

## 2024-04-15 RX ADMIN — ONDANSETRON 4 MILLIGRAM(S): 8 TABLET, FILM COATED ORAL at 14:54

## 2024-04-15 RX ADMIN — ONDANSETRON 4 MILLIGRAM(S): 8 TABLET, FILM COATED ORAL at 18:43

## 2024-04-15 RX ADMIN — MORPHINE SULFATE 4 MILLIGRAM(S): 50 CAPSULE, EXTENDED RELEASE ORAL at 14:41

## 2024-04-15 RX ADMIN — MORPHINE SULFATE 4 MILLIGRAM(S): 50 CAPSULE, EXTENDED RELEASE ORAL at 15:00

## 2024-04-15 RX ADMIN — INSULIN HUMAN 10 UNIT(S): 100 INJECTION, SOLUTION SUBCUTANEOUS at 18:43

## 2024-04-15 RX ADMIN — HYDROMORPHONE HYDROCHLORIDE 2 MILLIGRAM(S): 2 INJECTION INTRAMUSCULAR; INTRAVENOUS; SUBCUTANEOUS at 17:00

## 2024-04-15 RX ADMIN — HYDROMORPHONE HYDROCHLORIDE 6 MILLIGRAM(S): 2 INJECTION INTRAMUSCULAR; INTRAVENOUS; SUBCUTANEOUS at 20:40

## 2024-04-15 RX ADMIN — HYDROMORPHONE HYDROCHLORIDE 2 MILLIGRAM(S): 2 INJECTION INTRAMUSCULAR; INTRAVENOUS; SUBCUTANEOUS at 15:48

## 2024-04-15 NOTE — ED PROVIDER NOTE - OBJECTIVE STATEMENT
38-year-old female, medical history of sickle cell disease, diabetes (not diagnosed and not on any medications), presents this emergency room for back and leg pain for the last week.  Patient has been seen here multiple times this week, has received multiple rounds of Dilaudid, and be discharged.  States that she is from [place], where her hematologist is, and took 10 mg of p.o. Dilaudid this morning.  States that it did not work, so she came here because "they always give me my pain medicine".  Patient states that she is in town because of a  of a family member, and is supposed to go back to [place] next week.  Denies any chest pain, shortness of breath, abdominal pain, nausea, vomiting, diarrhea, constipation.

## 2024-04-15 NOTE — ED PROVIDER NOTE - CLINICAL SUMMARY MEDICAL DECISION MAKING FREE TEXT BOX
38-year-old female presents this emerged department for sickle cell pain  Patient has been seen here multiple times this past week, and has been treated with multiple rounds of Dilaudid  On arrival patient states that she took 10 mg of p.o. Dilaudid this morning.  Is requesting IM Dilaudid, and is refusing an IV.  However informed patient that this is inappropriate, and is now more amenable to an IV  Ultrasound-guided line placed, and patient received medications, blood work was done  Will continue to monitor patient

## 2024-04-15 NOTE — ED ADULT NURSE NOTE - NSFALLUNIVINTERV_ED_ALL_ED
Bed/Stretcher in lowest position, wheels locked, appropriate side rails in place/Call bell, personal items and telephone in reach/Instruct patient to call for assistance before getting out of bed/chair/stretcher/Non-slip footwear applied when patient is off stretcher/[place] to call system/Physically safe environment - no spills, clutter or unnecessary equipment/Purposeful proactive rounding/Room/bathroom lighting operational, light cord in reach

## 2024-04-15 NOTE — ED ADULT NURSE NOTE - OBJECTIVE STATEMENT
Patient is a 39 y/o F c/o sickle cell pain. patient reports sickle cell exacerbation leading to pain in back and legs. Patient reports hx of sickle cell and reports taking narcotics for pain. Patient also c/o nausea. Patient denies lightheadedness.

## 2024-04-15 NOTE — ED ADULT NURSE NOTE - CAS DISCH BELONGINGS RETURNED
[FreeTextEntry1] : I, Summer Wyatt, acted solely as a scribe for Dr. Forrest Correa on this date 10/10/2022.\par \par All medical record entries made by the Scribe were at my, Dr. Forrest Correa, direction and personally dictated by me on 10/10/2022. I have reviewed the chart and agree that the record accurately reflects my personal performance of the history, physical exam, assessment and plan. I have also personally directed, reviewed, and agreed with the chart.	\par  with pt/Yes

## 2024-04-15 NOTE — ED PROVIDER NOTE - PROGRESS NOTE DETAILS
Ultrasound IV infiltrated, another 1 was placed.  Labs reviewed, significant for a glucose of 400.  Patient states that she has a history of diabetes, however does not take any medications for it Labs and imaging reviewed  Patient has received another 6 mg of Dilaudid  Spoke with hospitalist, Dr. Braun, who is agreeable to admission  Results with patient.  Patient understands and agrees with plan. pt no longer has IV access attempted to establish IV access with US guided IV, refusing US guided IV requesting IM medications, IM Dilaudid is ordered

## 2024-04-16 DIAGNOSIS — R73.9 HYPERGLYCEMIA, UNSPECIFIED: ICD-10-CM

## 2024-04-16 DIAGNOSIS — D57.00 HB-SS DISEASE WITH CRISIS, UNSPECIFIED: ICD-10-CM

## 2024-04-16 DIAGNOSIS — N17.9 ACUTE KIDNEY FAILURE, UNSPECIFIED: ICD-10-CM

## 2024-04-16 DIAGNOSIS — R77.8 OTHER SPECIFIED ABNORMALITIES OF PLASMA PROTEINS: ICD-10-CM

## 2024-04-16 DIAGNOSIS — E11.65 TYPE 2 DIABETES MELLITUS WITH HYPERGLYCEMIA: ICD-10-CM

## 2024-04-16 DIAGNOSIS — D75.839 THROMBOCYTOSIS, UNSPECIFIED: ICD-10-CM

## 2024-04-16 DIAGNOSIS — Z29.9 ENCOUNTER FOR PROPHYLACTIC MEASURES, UNSPECIFIED: ICD-10-CM

## 2024-04-16 LAB
A1C WITH ESTIMATED AVERAGE GLUCOSE RESULT: 9.9 % — HIGH (ref 4–5.6)
ADD ON TEST-SPECIMEN IN LAB: SIGNIFICANT CHANGE UP
ADD ON TEST-SPECIMEN IN LAB: SIGNIFICANT CHANGE UP
ALBUMIN SERPL ELPH-MCNC: 3.6 G/DL — SIGNIFICANT CHANGE UP (ref 3.3–5)
ALP SERPL-CCNC: 103 U/L — SIGNIFICANT CHANGE UP (ref 40–120)
ALT FLD-CCNC: 11 U/L — SIGNIFICANT CHANGE UP (ref 10–45)
ANION GAP SERPL CALC-SCNC: 12 MMOL/L — SIGNIFICANT CHANGE UP (ref 5–17)
ANION GAP SERPL CALC-SCNC: 12 MMOL/L — SIGNIFICANT CHANGE UP (ref 5–17)
APPEARANCE UR: ABNORMAL
AST SERPL-CCNC: 14 U/L — SIGNIFICANT CHANGE UP (ref 10–40)
BACTERIA # UR AUTO: ABNORMAL /HPF
BILIRUB SERPL-MCNC: 0.2 MG/DL — SIGNIFICANT CHANGE UP (ref 0.2–1.2)
BILIRUB UR-MCNC: NEGATIVE — SIGNIFICANT CHANGE UP
BLD GP AB SCN SERPL QL: NEGATIVE — SIGNIFICANT CHANGE UP
BUN SERPL-MCNC: 29 MG/DL — HIGH (ref 7–23)
BUN SERPL-MCNC: 37 MG/DL — HIGH (ref 7–23)
CALCIUM SERPL-MCNC: 9.3 MG/DL — SIGNIFICANT CHANGE UP (ref 8.4–10.5)
CALCIUM SERPL-MCNC: 9.6 MG/DL — SIGNIFICANT CHANGE UP (ref 8.4–10.5)
CAST: 15 /LPF — HIGH (ref 0–4)
CHLORIDE SERPL-SCNC: 100 MMOL/L — SIGNIFICANT CHANGE UP (ref 96–108)
CHLORIDE SERPL-SCNC: 98 MMOL/L — SIGNIFICANT CHANGE UP (ref 96–108)
CK MB CFR SERPL CALC: 2.4 NG/ML — SIGNIFICANT CHANGE UP (ref 0–6.7)
CK SERPL-CCNC: 143 U/L — SIGNIFICANT CHANGE UP (ref 25–170)
CO2 SERPL-SCNC: 24 MMOL/L — SIGNIFICANT CHANGE UP (ref 22–31)
CO2 SERPL-SCNC: 24 MMOL/L — SIGNIFICANT CHANGE UP (ref 22–31)
COD CRY URNS QL: PRESENT
COLOR SPEC: SIGNIFICANT CHANGE UP
CREAT ?TM UR-MCNC: 287 MG/DL — SIGNIFICANT CHANGE UP
CREAT ?TM UR-MCNC: 295 MG/DL — SIGNIFICANT CHANGE UP
CREAT SERPL-MCNC: 4.35 MG/DL — HIGH (ref 0.5–1.3)
CREAT SERPL-MCNC: 4.9 MG/DL — HIGH (ref 0.5–1.3)
DIFF PNL FLD: ABNORMAL
EGFR: 11 ML/MIN/1.73M2 — LOW
EGFR: 13 ML/MIN/1.73M2 — LOW
ESTIMATED AVERAGE GLUCOSE: 237 MG/DL — HIGH (ref 68–114)
FINE GRAN CASTS #/AREA URNS AUTO: PRESENT
GLUCOSE BLDC GLUCOMTR-MCNC: 230 MG/DL — HIGH (ref 70–99)
GLUCOSE BLDC GLUCOMTR-MCNC: 441 MG/DL — HIGH (ref 70–99)
GLUCOSE BLDC GLUCOMTR-MCNC: 444 MG/DL — HIGH (ref 70–99)
GLUCOSE SERPL-MCNC: 270 MG/DL — HIGH (ref 70–99)
GLUCOSE SERPL-MCNC: 400 MG/DL — HIGH (ref 70–99)
GLUCOSE UR QL: >=1000 MG/DL
HAPTOGLOB SERPL-MCNC: 261 MG/DL — HIGH (ref 34–200)
HCT VFR BLD CALC: 32.8 % — LOW (ref 34.5–45)
HGB BLD-MCNC: 9.6 G/DL — LOW (ref 11.5–15.5)
HYALINE CASTS # UR AUTO: PRESENT
KETONES UR-MCNC: ABNORMAL MG/DL
LDH SERPL L TO P-CCNC: 275 U/L — HIGH (ref 50–242)
LEUKOCYTE ESTERASE UR-ACNC: NEGATIVE — SIGNIFICANT CHANGE UP
MAGNESIUM SERPL-MCNC: 2.2 MG/DL — SIGNIFICANT CHANGE UP (ref 1.6–2.6)
MAGNESIUM SERPL-MCNC: 2.3 MG/DL — SIGNIFICANT CHANGE UP (ref 1.6–2.6)
MCHC RBC-ENTMCNC: 26.8 PG — LOW (ref 27–34)
MCHC RBC-ENTMCNC: 29.3 GM/DL — LOW (ref 32–36)
MCV RBC AUTO: 91.6 FL — SIGNIFICANT CHANGE UP (ref 80–100)
MUCOUS THREADS # UR AUTO: PRESENT
NITRITE UR-MCNC: NEGATIVE — SIGNIFICANT CHANGE UP
NRBC # BLD: 0 /100 WBCS — SIGNIFICANT CHANGE UP (ref 0–0)
OSMOLALITY UR: 400 MOSM/KG — SIGNIFICANT CHANGE UP (ref 300–900)
PH UR: 5 — SIGNIFICANT CHANGE UP (ref 5–8)
PHOSPHATE SERPL-MCNC: 5.1 MG/DL — HIGH (ref 2.5–4.5)
PHOSPHATE SERPL-MCNC: 5.6 MG/DL — HIGH (ref 2.5–4.5)
PLATELET # BLD AUTO: 405 K/UL — HIGH (ref 150–400)
POTASSIUM SERPL-MCNC: 4.2 MMOL/L — SIGNIFICANT CHANGE UP (ref 3.5–5.3)
POTASSIUM SERPL-MCNC: 4.4 MMOL/L — SIGNIFICANT CHANGE UP (ref 3.5–5.3)
POTASSIUM SERPL-SCNC: 4.2 MMOL/L — SIGNIFICANT CHANGE UP (ref 3.5–5.3)
POTASSIUM SERPL-SCNC: 4.4 MMOL/L — SIGNIFICANT CHANGE UP (ref 3.5–5.3)
POTASSIUM UR-SCNC: 56 MMOL/L — SIGNIFICANT CHANGE UP
PROT ?TM UR-MCNC: 332 MG/DL — SIGNIFICANT CHANGE UP (ref 0–12)
PROT ?TM UR-MCNC: 335 MG/DL — SIGNIFICANT CHANGE UP (ref 0–12)
PROT SERPL-MCNC: 7.5 G/DL — SIGNIFICANT CHANGE UP (ref 6–8.3)
PROT UR-MCNC: 300 MG/DL
PROT/CREAT UR-RTO: 1.1 RATIO — SIGNIFICANT CHANGE UP (ref 0–0.2)
PROT/CREAT UR-RTO: 1.2 RATIO — SIGNIFICANT CHANGE UP (ref 0–0.2)
RBC # BLD: 3.58 M/UL — LOW (ref 3.8–5.2)
RBC # BLD: 3.58 M/UL — LOW (ref 3.8–5.2)
RBC # FLD: 16.8 % — HIGH (ref 10.3–14.5)
RBC CASTS # UR COMP ASSIST: 2 /HPF — SIGNIFICANT CHANGE UP (ref 0–4)
RETICS #: 104.8 K/UL — SIGNIFICANT CHANGE UP (ref 25–125)
RETICS/RBC NFR: 3 % — HIGH (ref 0.5–2.5)
RH IG SCN BLD-IMP: POSITIVE — SIGNIFICANT CHANGE UP
SICKLE CELL DISEASE SCREENING TEST: NEGATIVE — SIGNIFICANT CHANGE UP
SODIUM SERPL-SCNC: 134 MMOL/L — LOW (ref 135–145)
SODIUM SERPL-SCNC: 136 MMOL/L — SIGNIFICANT CHANGE UP (ref 135–145)
SODIUM UR-SCNC: <20 MMOL/L — SIGNIFICANT CHANGE UP
SP GR SPEC: 1.02 — SIGNIFICANT CHANGE UP (ref 1–1.03)
SQUAMOUS # UR AUTO: 26 /HPF — HIGH (ref 0–5)
TROPONIN T, HIGH SENSITIVITY RESULT: 28 NG/L — SIGNIFICANT CHANGE UP (ref 0–51)
UROBILINOGEN FLD QL: 1 MG/DL — SIGNIFICANT CHANGE UP (ref 0.2–1)
UUN UR-MCNC: 282 MG/DL — SIGNIFICANT CHANGE UP
WBC # BLD: 11.07 K/UL — HIGH (ref 3.8–10.5)
WBC # FLD AUTO: 11.07 K/UL — HIGH (ref 3.8–10.5)
WBC UR QL: 14 /HPF — HIGH (ref 0–5)

## 2024-04-16 PROCEDURE — 99223 1ST HOSP IP/OBS HIGH 75: CPT | Mod: GC

## 2024-04-16 PROCEDURE — 78582 LUNG VENTILAT&PERFUS IMAGING: CPT | Mod: 26

## 2024-04-16 PROCEDURE — 71045 X-RAY EXAM CHEST 1 VIEW: CPT | Mod: 26

## 2024-04-16 PROCEDURE — 83020 HEMOGLOBIN ELECTROPHORESIS: CPT | Mod: 26

## 2024-04-16 PROCEDURE — 36415 COLL VENOUS BLD VENIPUNCTURE: CPT

## 2024-04-16 PROCEDURE — 99255 IP/OBS CONSLTJ NEW/EST HI 80: CPT

## 2024-04-16 PROCEDURE — 99222 1ST HOSP IP/OBS MODERATE 55: CPT | Mod: GC

## 2024-04-16 PROCEDURE — 99291 CRITICAL CARE FIRST HOUR: CPT | Mod: GC

## 2024-04-16 PROCEDURE — 76937 US GUIDE VASCULAR ACCESS: CPT | Mod: 26

## 2024-04-16 RX ORDER — ACETAMINOPHEN 500 MG
650 TABLET ORAL EVERY 6 HOURS
Refills: 0 | Status: DISCONTINUED | OUTPATIENT
Start: 2024-04-16 | End: 2024-04-16

## 2024-04-16 RX ORDER — HYDROMORPHONE HYDROCHLORIDE 2 MG/ML
6 INJECTION INTRAMUSCULAR; INTRAVENOUS; SUBCUTANEOUS
Refills: 0 | Status: DISCONTINUED | OUTPATIENT
Start: 2024-04-16 | End: 2024-04-16

## 2024-04-16 RX ORDER — INFLUENZA VIRUS VACCINE 15; 15; 15; 15 UG/.5ML; UG/.5ML; UG/.5ML; UG/.5ML
0.5 SUSPENSION INTRAMUSCULAR ONCE
Refills: 0 | Status: DISCONTINUED | OUTPATIENT
Start: 2024-04-16 | End: 2024-04-18

## 2024-04-16 RX ORDER — DIPHENHYDRAMINE HCL 50 MG
50 CAPSULE ORAL EVERY 4 HOURS
Refills: 0 | Status: DISCONTINUED | OUTPATIENT
Start: 2024-04-16 | End: 2024-04-16

## 2024-04-16 RX ORDER — HYDROMORPHONE HYDROCHLORIDE 2 MG/ML
6 INJECTION INTRAMUSCULAR; INTRAVENOUS; SUBCUTANEOUS ONCE
Refills: 0 | Status: DISCONTINUED | OUTPATIENT
Start: 2024-04-16 | End: 2024-04-16

## 2024-04-16 RX ORDER — HYDROMORPHONE HYDROCHLORIDE 2 MG/ML
30 INJECTION INTRAMUSCULAR; INTRAVENOUS; SUBCUTANEOUS
Refills: 0 | Status: DISCONTINUED | OUTPATIENT
Start: 2024-04-16 | End: 2024-04-16

## 2024-04-16 RX ORDER — FOLIC ACID 0.8 MG
1 TABLET ORAL EVERY 24 HOURS
Refills: 0 | Status: DISCONTINUED | OUTPATIENT
Start: 2024-04-16 | End: 2024-04-18

## 2024-04-16 RX ORDER — SODIUM CHLORIDE 9 MG/ML
1000 INJECTION, SOLUTION INTRAVENOUS
Refills: 0 | Status: DISCONTINUED | OUTPATIENT
Start: 2024-04-16 | End: 2024-04-16

## 2024-04-16 RX ORDER — ONDANSETRON 8 MG/1
4 TABLET, FILM COATED ORAL EVERY 8 HOURS
Refills: 0 | Status: DISCONTINUED | OUTPATIENT
Start: 2024-04-16 | End: 2024-04-16

## 2024-04-16 RX ORDER — SENNA PLUS 8.6 MG/1
2 TABLET ORAL AT BEDTIME
Refills: 0 | Status: DISCONTINUED | OUTPATIENT
Start: 2024-04-16 | End: 2024-04-18

## 2024-04-16 RX ORDER — DIPHENHYDRAMINE HCL 50 MG
50 CAPSULE ORAL EVERY 6 HOURS
Refills: 0 | Status: DISCONTINUED | OUTPATIENT
Start: 2024-04-16 | End: 2024-04-17

## 2024-04-16 RX ORDER — INSULIN LISPRO 100/ML
VIAL (ML) SUBCUTANEOUS
Refills: 0 | Status: DISCONTINUED | OUTPATIENT
Start: 2024-04-16 | End: 2024-04-18

## 2024-04-16 RX ORDER — DIPHENHYDRAMINE HCL 50 MG
25 CAPSULE ORAL ONCE
Refills: 0 | Status: COMPLETED | OUTPATIENT
Start: 2024-04-16 | End: 2024-04-16

## 2024-04-16 RX ORDER — CHLORHEXIDINE GLUCONATE 213 G/1000ML
1 SOLUTION TOPICAL
Refills: 0 | Status: DISCONTINUED | OUTPATIENT
Start: 2024-04-16 | End: 2024-04-17

## 2024-04-16 RX ORDER — HYDROMORPHONE HYDROCHLORIDE 2 MG/ML
2 INJECTION INTRAMUSCULAR; INTRAVENOUS; SUBCUTANEOUS
Refills: 0 | Status: DISCONTINUED | OUTPATIENT
Start: 2024-04-16 | End: 2024-04-16

## 2024-04-16 RX ORDER — ACETAMINOPHEN 500 MG
975 TABLET ORAL EVERY 6 HOURS
Refills: 0 | Status: DISCONTINUED | OUTPATIENT
Start: 2024-04-16 | End: 2024-04-18

## 2024-04-16 RX ORDER — NALOXONE HYDROCHLORIDE 4 MG/.1ML
0.1 SPRAY NASAL
Refills: 0 | Status: DISCONTINUED | OUTPATIENT
Start: 2024-04-16 | End: 2024-04-17

## 2024-04-16 RX ORDER — DEXTROSE 50 % IN WATER 50 %
15 SYRINGE (ML) INTRAVENOUS ONCE
Refills: 0 | Status: DISCONTINUED | OUTPATIENT
Start: 2024-04-16 | End: 2024-04-18

## 2024-04-16 RX ORDER — DEXTROSE 50 % IN WATER 50 %
12.5 SYRINGE (ML) INTRAVENOUS ONCE
Refills: 0 | Status: DISCONTINUED | OUTPATIENT
Start: 2024-04-16 | End: 2024-04-18

## 2024-04-16 RX ORDER — HEPARIN SODIUM 5000 [USP'U]/ML
5000 INJECTION INTRAVENOUS; SUBCUTANEOUS EVERY 8 HOURS
Refills: 0 | Status: DISCONTINUED | OUTPATIENT
Start: 2024-04-16 | End: 2024-04-16

## 2024-04-16 RX ORDER — INSULIN LISPRO 100/ML
5 VIAL (ML) SUBCUTANEOUS
Refills: 0 | Status: DISCONTINUED | OUTPATIENT
Start: 2024-04-16 | End: 2024-04-17

## 2024-04-16 RX ORDER — CYCLOBENZAPRINE HYDROCHLORIDE 10 MG/1
5 TABLET, FILM COATED ORAL THREE TIMES A DAY
Refills: 0 | Status: DISCONTINUED | OUTPATIENT
Start: 2024-04-16 | End: 2024-04-17

## 2024-04-16 RX ORDER — TIRZEPATIDE 15 MG/.5ML
2.5 INJECTION, SOLUTION SUBCUTANEOUS
Qty: 4 | Refills: 0
Start: 2024-04-16

## 2024-04-16 RX ORDER — HYDROMORPHONE HYDROCHLORIDE 2 MG/ML
6 INJECTION INTRAMUSCULAR; INTRAVENOUS; SUBCUTANEOUS
Refills: 0 | Status: DISCONTINUED | OUTPATIENT
Start: 2024-04-16 | End: 2024-04-17

## 2024-04-16 RX ORDER — INSULIN GLARGINE 100 [IU]/ML
25 INJECTION, SOLUTION SUBCUTANEOUS AT BEDTIME
Refills: 0 | Status: DISCONTINUED | OUTPATIENT
Start: 2024-04-16 | End: 2024-04-17

## 2024-04-16 RX ORDER — SODIUM CHLORIDE 9 MG/ML
1000 INJECTION, SOLUTION INTRAVENOUS
Refills: 0 | Status: DISCONTINUED | OUTPATIENT
Start: 2024-04-16 | End: 2024-04-18

## 2024-04-16 RX ORDER — DEXTROSE 10 % IN WATER 10 %
125 INTRAVENOUS SOLUTION INTRAVENOUS ONCE
Refills: 0 | Status: DISCONTINUED | OUTPATIENT
Start: 2024-04-16 | End: 2024-04-18

## 2024-04-16 RX ORDER — SODIUM CHLORIDE 9 MG/ML
1000 INJECTION, SOLUTION INTRAVENOUS
Refills: 0 | Status: DISCONTINUED | OUTPATIENT
Start: 2024-04-16 | End: 2024-04-17

## 2024-04-16 RX ORDER — ISOPROPYL ALCOHOL, BENZOCAINE .7; .06 ML/ML; ML/ML
0 SWAB TOPICAL
Qty: 100 | Refills: 1
Start: 2024-04-16

## 2024-04-16 RX ORDER — LANOLIN ALCOHOL/MO/W.PET/CERES
3 CREAM (GRAM) TOPICAL AT BEDTIME
Refills: 0 | Status: DISCONTINUED | OUTPATIENT
Start: 2024-04-16 | End: 2024-04-16

## 2024-04-16 RX ORDER — ONDANSETRON 8 MG/1
4 TABLET, FILM COATED ORAL EVERY 6 HOURS
Refills: 0 | Status: DISCONTINUED | OUTPATIENT
Start: 2024-04-16 | End: 2024-04-16

## 2024-04-16 RX ORDER — HYDROXYUREA 500 MG/1
500 CAPSULE ORAL EVERY 12 HOURS
Refills: 0 | Status: DISCONTINUED | OUTPATIENT
Start: 2024-04-16 | End: 2024-04-17

## 2024-04-16 RX ORDER — DEXTROSE 50 % IN WATER 50 %
25 SYRINGE (ML) INTRAVENOUS ONCE
Refills: 0 | Status: DISCONTINUED | OUTPATIENT
Start: 2024-04-16 | End: 2024-04-18

## 2024-04-16 RX ORDER — GLUCAGON INJECTION, SOLUTION 0.5 MG/.1ML
1 INJECTION, SOLUTION SUBCUTANEOUS ONCE
Refills: 0 | Status: DISCONTINUED | OUTPATIENT
Start: 2024-04-16 | End: 2024-04-18

## 2024-04-16 RX ORDER — HEPARIN SODIUM 5000 [USP'U]/ML
7500 INJECTION INTRAVENOUS; SUBCUTANEOUS EVERY 8 HOURS
Refills: 0 | Status: DISCONTINUED | OUTPATIENT
Start: 2024-04-16 | End: 2024-04-18

## 2024-04-16 RX ORDER — DIPHENHYDRAMINE HCL 50 MG
50 CAPSULE ORAL EVERY 6 HOURS
Refills: 0 | Status: DISCONTINUED | OUTPATIENT
Start: 2024-04-16 | End: 2024-04-16

## 2024-04-16 RX ADMIN — HYDROMORPHONE HYDROCHLORIDE 6 MILLIGRAM(S): 2 INJECTION INTRAMUSCULAR; INTRAVENOUS; SUBCUTANEOUS at 08:53

## 2024-04-16 RX ADMIN — Medication 50 MILLIGRAM(S): at 22:26

## 2024-04-16 RX ADMIN — HYDROMORPHONE HYDROCHLORIDE 6 MILLIGRAM(S): 2 INJECTION INTRAMUSCULAR; INTRAVENOUS; SUBCUTANEOUS at 22:30

## 2024-04-16 RX ADMIN — INSULIN GLARGINE 25 UNIT(S): 100 INJECTION, SOLUTION SUBCUTANEOUS at 23:32

## 2024-04-16 RX ADMIN — SODIUM CHLORIDE 150 MILLILITER(S): 9 INJECTION, SOLUTION INTRAVENOUS at 08:54

## 2024-04-16 RX ADMIN — Medication 975 MILLIGRAM(S): at 22:00

## 2024-04-16 RX ADMIN — HYDROMORPHONE HYDROCHLORIDE 6 MILLIGRAM(S): 2 INJECTION INTRAMUSCULAR; INTRAVENOUS; SUBCUTANEOUS at 22:26

## 2024-04-16 RX ADMIN — CYCLOBENZAPRINE HYDROCHLORIDE 5 MILLIGRAM(S): 10 TABLET, FILM COATED ORAL at 23:31

## 2024-04-16 RX ADMIN — HYDROXYUREA 500 MILLIGRAM(S): 500 CAPSULE ORAL at 23:32

## 2024-04-16 RX ADMIN — Medication 975 MILLIGRAM(S): at 21:05

## 2024-04-16 RX ADMIN — SODIUM CHLORIDE 100 MILLILITER(S): 9 INJECTION, SOLUTION INTRAVENOUS at 06:41

## 2024-04-16 RX ADMIN — Medication 5 UNIT(S): at 19:01

## 2024-04-16 RX ADMIN — HYDROMORPHONE HYDROCHLORIDE 6 MILLIGRAM(S): 2 INJECTION INTRAMUSCULAR; INTRAVENOUS; SUBCUTANEOUS at 06:46

## 2024-04-16 RX ADMIN — HEPARIN SODIUM 7500 UNIT(S): 5000 INJECTION INTRAVENOUS; SUBCUTANEOUS at 22:12

## 2024-04-16 RX ADMIN — Medication 25 MILLIGRAM(S): at 08:53

## 2024-04-16 RX ADMIN — Medication 12: at 19:01

## 2024-04-16 RX ADMIN — HYDROMORPHONE HYDROCHLORIDE 6 MILLIGRAM(S): 2 INJECTION INTRAMUSCULAR; INTRAVENOUS; SUBCUTANEOUS at 03:37

## 2024-04-16 RX ADMIN — HYDROMORPHONE HYDROCHLORIDE 6 MILLIGRAM(S): 2 INJECTION INTRAMUSCULAR; INTRAVENOUS; SUBCUTANEOUS at 19:37

## 2024-04-16 RX ADMIN — Medication 50 MILLIGRAM(S): at 18:38

## 2024-04-16 RX ADMIN — HYDROMORPHONE HYDROCHLORIDE 6 MILLIGRAM(S): 2 INJECTION INTRAMUSCULAR; INTRAVENOUS; SUBCUTANEOUS at 02:10

## 2024-04-16 RX ADMIN — HYDROMORPHONE HYDROCHLORIDE 6 MILLIGRAM(S): 2 INJECTION INTRAMUSCULAR; INTRAVENOUS; SUBCUTANEOUS at 09:20

## 2024-04-16 RX ADMIN — HYDROMORPHONE HYDROCHLORIDE 30 MILLILITER(S): 2 INJECTION INTRAMUSCULAR; INTRAVENOUS; SUBCUTANEOUS at 15:54

## 2024-04-16 RX ADMIN — Medication 1 MILLIGRAM(S): at 12:13

## 2024-04-16 RX ADMIN — HYDROMORPHONE HYDROCHLORIDE 30 MILLILITER(S): 2 INJECTION INTRAMUSCULAR; INTRAVENOUS; SUBCUTANEOUS at 12:00

## 2024-04-16 RX ADMIN — Medication 4: at 22:12

## 2024-04-16 RX ADMIN — HYDROMORPHONE HYDROCHLORIDE 6 MILLIGRAM(S): 2 INJECTION INTRAMUSCULAR; INTRAVENOUS; SUBCUTANEOUS at 06:04

## 2024-04-16 RX ADMIN — HYDROXYUREA 500 MILLIGRAM(S): 500 CAPSULE ORAL at 12:14

## 2024-04-16 RX ADMIN — SODIUM CHLORIDE 1000 MILLILITER(S): 9 INJECTION INTRAMUSCULAR; INTRAVENOUS; SUBCUTANEOUS at 02:10

## 2024-04-16 RX ADMIN — Medication 50 MILLIGRAM(S): at 13:03

## 2024-04-16 RX ADMIN — SODIUM CHLORIDE 250 MILLILITER(S): 9 INJECTION, SOLUTION INTRAVENOUS at 15:53

## 2024-04-16 RX ADMIN — HYDROMORPHONE HYDROCHLORIDE 4 MILLIGRAM(S): 2 INJECTION INTRAMUSCULAR; INTRAVENOUS; SUBCUTANEOUS at 02:10

## 2024-04-16 NOTE — PATIENT PROFILE ADULT - FUNCTIONAL ASSESSMENT - BASIC MOBILITY 6.
4-calculated by average/Not able to assess (calculate score using [facility] averaging method)

## 2024-04-16 NOTE — CONSULT NOTE ADULT - SUBJECTIVE AND OBJECTIVE BOX
HISTORY OF PRESENT ILLNESS  SMITA POSADA is a 38y Female with a past medical history of     Endocrinology has been consulted for Diabetes Management.    DIABETES HISTORY  - Age at diagnosis:   - Diabetes managed outpatient by:  - Current Therapy:  - History of other regimens:   - History of hypoglycemia:   - History of DKA/HHS:   - Diabetic Complications:   - Home glucose readings:  - Diet:          > Breakfast:         > Lunch:        > Dinner:        > Snacks:    FAMILY HISTORY  - Diabetes:    SOCIAL HISTORY  - Work:  - Alcohol:  - Smoking:    ALLERGIES  No Known Allergies    CURRENT MEDICATIONS  acetaminophen     Tablet .. 975 milliGRAM(s) Oral every 6 hours PRN  bisacodyl 5 milliGRAM(s) Oral every 12 hours PRN  dextrose 10% Bolus 125 milliLiter(s) IV Bolus once  dextrose 5%. 1000 milliLiter(s) IV Continuous <Continuous>  dextrose 5%. 1000 milliLiter(s) IV Continuous <Continuous>  dextrose 50% Injectable 25 Gram(s) IV Push once  dextrose 50% Injectable 12.5 Gram(s) IV Push once  dextrose Oral Gel 15 Gram(s) Oral once PRN  diphenhydrAMINE 50 milliGRAM(s) Oral every 4 hours PRN  folic acid 1 milliGRAM(s) Oral every 24 hours  glucagon  Injectable 1 milliGRAM(s) IntraMuscular once  HYDROmorphone PCA (1 mG/mL) 30 milliLiter(s) PCA Continuous PCA Continuous  hydroxyurea (Non - oncologic) 500 milliGRAM(s) Oral every 12 hours  influenza   Vaccine 0.5 milliLiter(s) IntraMuscular once  insulin lispro (ADMELOG) corrective regimen sliding scale   SubCutaneous Before meals and at bedtime  lactated ringers. 1000 milliLiter(s) IV Continuous <Continuous>  naloxone Injectable 0.1 milliGRAM(s) IV Push every 3 minutes PRN  ondansetron Injectable 4 milliGRAM(s) IV Push every 6 hours PRN  senna 2 Tablet(s) Oral at bedtime    REVIEW OF SYSTEMS  Constitutional:  Negative fever, chills   Cardiovascular:  Negative for chest pain or palpitations.  Respiratory:  Negative for cough, wheezing, or shortness of breath.   Gastrointestinal:  Negative for nausea, vomiting, diarrhea, constipation, or abdominal pain.  Genitourinary:  Negative frequency, urgency or dysuria.  Neurologic:  No headache, confusion, dizziness    PHYSICAL EXAM  Vital Signs Last 24 Hrs  T(C): 36.7 (16 Apr 2024 10:10), Max: 37.1 (15 Apr 2024 13:48)  T(F): 98.1 (16 Apr 2024 10:10), Max: 98.7 (15 Apr 2024 13:48)  HR: 101 (16 Apr 2024 10:10) (96 - 106)  BP: 145/82 (16 Apr 2024 10:10) (119/82 - 169/95)  BP(mean): --  RR: 18 (16 Apr 2024 10:10) (16 - 18)  SpO2: 95% (16 Apr 2024 10:10) (93% - 97%)    Parameters below as of 16 Apr 2024 10:10  Patient On (Oxygen Delivery Method): nasal cannula  O2 Flow (L/min): 2    Constitutional: Awake, alert  HEENT: Normocephalic, atraumatic, CRISTY  Neck: supple, no acanthosis, no thyromegaly or palpable thyroid nodules.  Respiratory: Lungs clear to ausculation bilaterally.   Cardiovascular: regular rhythm, normal S1 and S2, no audible murmurs.   GI: soft, non-tender, non-distended, bowel sounds present  Extremities: No lower extremity edema, peripheral pulses present.     LABS  CBC - WBC/HGB/HTC/PLT: 11.07/9.6/32.8/405 (04-16-24)  BMP: Na/K/Cl/Bicarb/BUN/Cr/Gluc: 136/4.4/100/24/29/4.35/400 (04-16-24)  Anion Gap: 12 (04-16-24)  eGFR: 13 (04-16-24)  Calcium: 9.3 (04-16-24)  Phosphorus: 5.1 (04-16-24)  Magnesium: 2.3 (04-16-24)  LFT - Alb/Tprot/Tbili/Dbili/AlkPhos/ALT/AST: 3.1/--/0.2/--/120/21/15 (04-15-24)    CBC - WBC/HGB/HTC/PLT: 11.07/9.6/32.8/405 (04-16-24)BMP: Na/K/Cl/Bicarb/BUN/Cr/Gluc: 136/4.4/100/24/29/4.35/400 (04-16-24)  Anion Gap: 12 (04-16-24)  eGFR: 13 (04-16-24)  Calcium: 9.3 (04-16-24)  Phosphorus: 5.1 (04-16-24)  Magnesium: 2.3 (04-16-24)    CAPILLARY BLOOD GLUCOSE & INSULIN RECEIVED  386 mg/dL (04-15 @ 19:03)  315 mg/dL (04-15 @ 20:06)    ASSESSMENT / RECOMMENDATIONS    A1C: 9.9 %  BUN: 29  Creatinine: 4.35  GFR: 13  Weight: 136.1  BMI: 47  EF:     # Type 2 diabetes mellitus with hyperglycemia   HISTORY OF PRESENT ILLNESS  SMITA POSADA is a 38y Female with a past medical history of sickle cell disease presenting for back, b/l hip and leg pain for the past 1 week. She has been to the ER multiple times this week for pain, was treated with dilaudid then discharged from the ER. She took home dose dilaudid 10 mg PO x1 this morning without improvement before coming to the ER. Pain crises usually occur 6x/year, she goes to the ER, is given dilaudid 6 mg IM/IVP with benadryl 50 mg IVP, and is usually discharged from the ER, unless the pain is so severe that she is admitted for a few days of treatment. States her crises are usually in response to stress or changes in the weather. Believes her current crisis may be due to stress she feels in the setting of multiple recent deaths in her family. States she takes Dilaudid 10 mg q8h at home as needed for pain. Denies history of acute chest syndrome. Is s/p removal of R chest wall port due to line infection 2y ago. She is from [place], which is also where her hematologist is, and she is here in [place] for a family member's . Is due to return to [place] next week. Denies chest pain, shortness of breath, abdominal pain, nausea, vomiting, diarrhea, constipation.    Endocrinology has been consulted for Diabetes Management.    DIABETES HISTORY  "new diagnosis"  - Diet: drinks >1L non-diet pepsi per day, snacks on cookies, cake, chips    FAMILY HISTORY  - Diabetes: maternal great grandmother, materal grandmother, mother    SOCIAL HISTORY  - Work: from [employer]  - Alcohol: denies  - Smoking: denies    ALLERGIES  No Known Allergies    CURRENT MEDICATIONS  acetaminophen     Tablet .. 975 milliGRAM(s) Oral every 6 hours PRN  bisacodyl 5 milliGRAM(s) Oral every 12 hours PRN  dextrose 10% Bolus 125 milliLiter(s) IV Bolus once  dextrose 5%. 1000 milliLiter(s) IV Continuous <Continuous>  dextrose 5%. 1000 milliLiter(s) IV Continuous <Continuous>  dextrose 50% Injectable 25 Gram(s) IV Push once  dextrose 50% Injectable 12.5 Gram(s) IV Push once  dextrose Oral Gel 15 Gram(s) Oral once PRN  diphenhydrAMINE 50 milliGRAM(s) Oral every 4 hours PRN  folic acid 1 milliGRAM(s) Oral every 24 hours  glucagon  Injectable 1 milliGRAM(s) IntraMuscular once  HYDROmorphone PCA (1 mG/mL) 30 milliLiter(s) PCA Continuous PCA Continuous  hydroxyurea (Non - oncologic) 500 milliGRAM(s) Oral every 12 hours  influenza   Vaccine 0.5 milliLiter(s) IntraMuscular once  insulin lispro (ADMELOG) corrective regimen sliding scale   SubCutaneous Before meals and at bedtime  lactated ringers. 1000 milliLiter(s) IV Continuous <Continuous>  naloxone Injectable 0.1 milliGRAM(s) IV Push every 3 minutes PRN  ondansetron Injectable 4 milliGRAM(s) IV Push every 6 hours PRN  senna 2 Tablet(s) Oral at bedtime    REVIEW OF SYSTEMS  Constitutional:  Negative fever, chills   Cardiovascular:  Negative for chest pain or palpitations.  Respiratory:  Negative for cough, wheezing, or shortness of breath.   Gastrointestinal:  Negative for nausea, vomiting, diarrhea, constipation, or abdominal pain.  Genitourinary:  Negative frequency, urgency or dysuria.  Neurologic:  No headache, confusion, dizziness    PHYSICAL EXAM  Vital Signs Last 24 Hrs  T(C): 36.7 (2024 10:10), Max: 37.1 (15 Apr 2024 13:48)  T(F): 98.1 (2024 10:10), Max: 98.7 (15 Apr 2024 13:48)  HR: 101 (2024 10:10) (96 - 106)  BP: 145/82 (2024 10:10) (119/82 - 169/95)  BP(mean): --  RR: 18 (2024 10:10) (16 - 18)  SpO2: 95% (2024 10:10) (93% - 97%)    Parameters below as of 2024 10:10  Patient On (Oxygen Delivery Method): nasal cannula  O2 Flow (L/min): 2    Constitutional: Awake, alert, obese  HEENT: Normocephalic, atraumatic, CRISTY, hirtuism  Neck: supple, 4x4cm hyperpigmentation on anterior neck ?acanthosis, no thyromegaly or palpable thyroid nodules.  Respiratory: Lungs clear to ausculation bilaterally.   Cardiovascular: regular rhythm, normal S1 and S2, no audible murmurs.   GI: soft, non-tender, non-distended, bowel sounds present  Extremities: No lower extremity edema, peripheral pulses present.     LABS  CBC - WBC/HGB/HTC/PLT: 11.07/9.6/32.8/405 (24)  BMP: Na/K/Cl/Bicarb/BUN/Cr/Gluc: 136/4.4/100/24/29/4.35/400 (24)  Anion Gap: 12 (24)  eGFR: 13 (24)  Calcium: 9.3 (24)  Phosphorus: 5.1 (24)  Magnesium: 2.3 (24)  LFT - Alb/Tprot/Tbili/Dbili/AlkPhos/ALT/AST: 3.1/--/0.2/--/120/21/15 (04-15-24)    CBC - WBC/HGB/HTC/PLT: 11.07/9.6/32.8/405 (24)BMP: Na/K/Cl/Bicarb/BUN/Cr/Gluc: 136/4.4/100/24/29/4.35/400 (24)  Anion Gap: 12 (24)  eGFR: 13 (24)  Calcium: 9.3 (24)  Phosphorus: 5.1 (24)  Magnesium: 2.3 (24)    CAPILLARY BLOOD GLUCOSE & INSULIN RECEIVED  386 mg/dL (04-15 @ 19:03)  315 mg/dL (04-15 @ 20:06)    ASSESSMENT / RECOMMENDATIONS    A1C: 9.9 %  BUN: 29  Creatinine: 4.35  GFR: 13  Weight: 136.1  BMI: 47  EF:    HISTORY OF PRESENT ILLNESS  SMITA POSADA is a 38y Female with a past medical history of sickle cell disease presenting for back, b/l hip and leg pain for the past 1 week. She has been to the ER multiple times this week for pain, was treated with dilaudid then discharged from the ER. She took home dose dilaudid 10 mg PO x1 this morning without improvement before coming to the ER. Pain crises usually occur 6x/year, she goes to the ER, is given dilaudid 6 mg IM/IVP with benadryl 50 mg IVP, and is usually discharged from the ER, unless the pain is so severe that she is admitted for a few days of treatment. States her crises are usually in response to stress or changes in the weather. Believes her current crisis may be due to stress she feels in the setting of multiple recent deaths in her family. States she takes Dilaudid 10 mg q8h at home as needed for pain. Denies history of acute chest syndrome. Is s/p removal of R chest wall port due to line infection 2y ago. She is from [place], which is also where her hematologist is, and she is here in [place] for a family member's . Is due to return to [place] next week. Denies chest pain, shortness of breath, abdominal pain, nausea, vomiting, diarrhea, constipation.    Endocrinology has been consulted for Diabetes Management.    DIABETES HISTORY  "new diagnosis"  - Diet: drinks >1L non-diet pepsi per day, snacks on cookies, cake, chips    FAMILY HISTORY  - Diabetes: maternal great grandmother, materal grandmother, mother    SOCIAL HISTORY  - Work: from [place]  - Alcohol: denies  - Smoking: denies    ALLERGIES  No Known Allergies    CURRENT MEDICATIONS  acetaminophen     Tablet .. 975 milliGRAM(s) Oral every 6 hours PRN  bisacodyl 5 milliGRAM(s) Oral every 12 hours PRN  dextrose 10% Bolus 125 milliLiter(s) IV Bolus once  dextrose 5%. 1000 milliLiter(s) IV Continuous <Continuous>  dextrose 5%. 1000 milliLiter(s) IV Continuous <Continuous>  dextrose 50% Injectable 25 Gram(s) IV Push once  dextrose 50% Injectable 12.5 Gram(s) IV Push once  dextrose Oral Gel 15 Gram(s) Oral once PRN  diphenhydrAMINE 50 milliGRAM(s) Oral every 4 hours PRN  folic acid 1 milliGRAM(s) Oral every 24 hours  glucagon  Injectable 1 milliGRAM(s) IntraMuscular once  HYDROmorphone PCA (1 mG/mL) 30 milliLiter(s) PCA Continuous PCA Continuous  hydroxyurea (Non - oncologic) 500 milliGRAM(s) Oral every 12 hours  influenza   Vaccine 0.5 milliLiter(s) IntraMuscular once  insulin lispro (ADMELOG) corrective regimen sliding scale   SubCutaneous Before meals and at bedtime  lactated ringers. 1000 milliLiter(s) IV Continuous <Continuous>  naloxone Injectable 0.1 milliGRAM(s) IV Push every 3 minutes PRN  ondansetron Injectable 4 milliGRAM(s) IV Push every 6 hours PRN  senna 2 Tablet(s) Oral at bedtime    REVIEW OF SYSTEMS  Constitutional:  Negative fever, chills   Cardiovascular:  Negative for chest pain or palpitations.  Respiratory:  Negative for cough, wheezing, or shortness of breath.   Gastrointestinal:  Negative for nausea, vomiting, diarrhea, constipation, or abdominal pain.  Genitourinary:  Negative frequency, urgency or dysuria.  Neurologic:  No headache, confusion, dizziness    PHYSICAL EXAM  Vital Signs Last 24 Hrs  T(C): 36.7 (2024 10:10), Max: 37.1 (15 Apr 2024 13:48)  T(F): 98.1 (2024 10:10), Max: 98.7 (15 Apr 2024 13:48)  HR: 101 (2024 10:10) (96 - 106)  BP: 145/82 (2024 10:10) (119/82 - 169/95)  BP(mean): --  RR: 18 (2024 10:10) (16 - 18)  SpO2: 95% (2024 10:10) (93% - 97%)    Parameters below as of 2024 10:10  Patient On (Oxygen Delivery Method): nasal cannula  O2 Flow (L/min): 2    Constitutional: Awake, alert, obese  HEENT: Normocephalic, atraumatic, CRISTY, hirtuism  Neck: supple, 4x4cm hyperpigmentation on anterior neck ?acanthosis, no thyromegaly or palpable thyroid nodules.  Respiratory: Lungs clear to ausculation bilaterally.   Cardiovascular: regular rhythm, normal S1 and S2, no audible murmurs.   GI: soft, non-tender, non-distended, bowel sounds present  Extremities: No lower extremity edema, peripheral pulses present.     LABS  CBC - WBC/HGB/HTC/PLT: 11.07/9.6/32.8/405 (24)  BMP: Na/K/Cl/Bicarb/BUN/Cr/Gluc: 136/4.4/100/24/29/4.35/400 (24)  Anion Gap: 12 (24)  eGFR: 13 (24)  Calcium: 9.3 (24)  Phosphorus: 5.1 (24)  Magnesium: 2.3 (24)  LFT - Alb/Tprot/Tbili/Dbili/AlkPhos/ALT/AST: 3.1/--/0.2/--/120/21/15 (04-15-24)    CBC - WBC/HGB/HTC/PLT: 11.07/9.6/32.8/405 (24)BMP: Na/K/Cl/Bicarb/BUN/Cr/Gluc: 136/4.4/100/24/29/4.35/400 (24)  Anion Gap: 12 (24)  eGFR: 13 (24)  Calcium: 9.3 (24)  Phosphorus: 5.1 (24)  Magnesium: 2.3 (24)    CAPILLARY BLOOD GLUCOSE & INSULIN RECEIVED  386 mg/dL (04-15 @ 19:03)  315 mg/dL (04-15 @ 20:06)    ASSESSMENT / RECOMMENDATIONS    A1C: 9.9 %  BUN: 29  Creatinine: 4.35  GFR: 13  Weight: 136.1  BMI: 47  EF:

## 2024-04-16 NOTE — CONSULT NOTE ADULT - ATTENDING COMMENTS
\"We were playing yesterday and I fell on my son and hurt my left ankle. It hurts when I stand on it. \"
I agree with the fellow's findings and plans as written above with the following additions/amendments:    Seen and examined at bedside. Long conversation with patient regarding SCD and CKD, relationship of repeat AKIs and progression of CKD, now also complicated by DM. Patient states her pain is uncontrolled but is having some nodding episodes. No signs of volume overload, given SCD as main cause of CKD and likely SHERWIN, fluids and oxygenation mainstays to reverse SHERWIN. No acute indications for RRT. Discussed in detail with primary team. Will follow closely with you
Please see progress note
Pt seen on rounds this afternoon.  38-yo woman with sickle cell disease admitted for management of crisis, which has not responded to transient increases in her analgesic regimen.  Was found to have glucose of 404 mg% on presentation, but denies any history of established DM.  (She implies that she may have had transient hyperglycemia noted during previous admissions for crisis, but was not given a diagnosis of actual DM).  Glucoses have remained in the 300-400 range since admission, though she has been given minimal insulin to this point.  Drinks large amounts of regular soda at home and diet includes large amounts of concentrated sweets  Exam is notable for marked obesity, facial hirsutism and acanthosis in the posterior cervical area.  --Aside from the severity of the hyperglycemia, her Aic level of 9.9% indicates that she has had chronic hyperglycemia--this is not just an acute phenomenon related to her crisis.  She appears to be "accepting" that she does have norman DM.  --The severity of the hyperglycemia is no doubt related to her large intake of soda and concentrated sweets  --Her creatinine level has also increased sharply from 1.9 to 4.35 in the past 24 hours.  This needs to be rechecked to rule out lab error.  Pt says that she has been urinating, but cannot quantitate this. She denies any use of NSAIDs for pain.  If she is in fact in norman renal failure, and roberto if oliguric, this has likely contributed to the severity of hyperglycemia because she has not been able to clear any excess glucose in her urine.  --The combination of her obesity, acanthosis, facial hirsutism and oligomenorrhea suggest the strong possibility of PCOS.  Will check a free testosterone level  --To start Lantus 25 units qhs, premeal lispro at only 5 units (given poor PO intake)

## 2024-04-16 NOTE — H&P ADULT - PROBLEM SELECTOR PLAN 1
Occur ~6x/year usually in response to stress or changes in the weather. Pain is typically controlled at home with Dialudid 10 mg PO q8h as needed, Hydroxyurea 500 mg BID. Also takes Folic acid 1 mg qd. Believes her pain crisis this time is 2/2 stress as there have been multiple deaths in her family recently. Tried taking her home Dilaudid 10 mg PO at home without improvement. Pain crisis started a few days ago and improved slightly but then worsened, which is why she came back to the ER. Has rx in [place] for Dilaudid mg. S/p Dilaudid 6 mg x3 and 4 mg x2 in the ER since arrival at 6:45pm 4/15.  - tylenol 975 mg q6h PRN mild pain  - dilaudid 2 mg q3h IVP PRN moderate pain  - dilaudid 6 mg q3h IVP PRN for severe pain and transition to PO 10 mg q8h once able  - benadryl 50 mg IVP q3h to be given PRN with dilaudid for itching  - continue hydroxyurea 500 mg q12h  - continue folic acid 1 mg q24h  - O2 nasal cannula PRN for SpO2 <95%  - incentive spirometry per routine  - senna qhs and dulcolax BID PRN for constipation while on opioids  - continue  cc/hr  - follow up repeat CBC, retic count, haptoglobin, LDH Occur ~6x/year usually in response to stress or changes in the weather. Pain is typically controlled at home with Dilaudid 10 mg PO q8h as needed, Hydroxyurea 500 mg BID. Also takes Folic acid 1 mg qd. Believes her pain crisis this time is 2/2 stress as there have been multiple deaths in her family recently. Tried taking her home Dilaudid 10 mg PO at home without improvement. Pain crisis started a few days ago and improved slightly but then worsened, which is why she came back to the ER. Has rx in [place] for Dilaudid mg. S/p Dilaudid 6 mg x3 and 4 mg x2 in the ER since arrival at 6:45pm 4/15. CXR clear in the ER, no concern for acute chest syndrome at this time.  - dilaudid PCA pump for ~2 days, then transition to PO 10 mg q8h once able  - benadryl 50 mg PO q4h PRN for itching  - continue hydroxyurea 500 mg q12h  - continue folic acid 1 mg q24h  - O2 nasal cannula PRN for SpO2 <95%  - incentive spirometry per routine  - senna qhs and dulcolax BID PRN for constipation while on opioids  - continue  cc/hr  - follow up repeat CBC, retic count, haptoglobin, LDH and sickle cell screen given inability to reach outpatient hematologist at this time  - try to obtain collateral from outpatient hematologist

## 2024-04-16 NOTE — H&P ADULT - PROBLEM SELECTOR PLAN 2
Pt reports her kidneys are usually affected when she has a pain crisis and her numbers increase, but that she does not have baseline kidney dysfunction. Cr typically improves with IVF per pt. Baseline Cr unknown. Cr 1.97 on admission, lower than Cr 2.1-2.8 when she was in the ER earlier this week. Currently on standing IVF for management of sickle cell pain crisis which will also help if there is a prerenal component. Cr reassuringly downtrending from prior values earlier this week.  - continue IVF and monitor Cr vs CKD vs SHERWIN on CKD  Pt reports her kidneys are usually affected when she has a pain crisis and her numbers increase, but that she does not have baseline kidney dysfunction. Cr typically improves with IVF per pt. Baseline Cr unknown. Cr 1.97 on admission, lower than Cr 2.1-2.8 when she was in the ER earlier this week. Currently on standing IVF for management of sickle cell pain crisis which will also help if there is a prerenal component. Cr reassuringly downtrending from prior values earlier this week, however GFR 30s. Low utility for U[place] given that pt received a bolus and has been on standing IVF.  - continue IVF and monitor Cr

## 2024-04-16 NOTE — H&P ADULT - ATTENDING COMMENTS
Patient seen and examined by me. Case discussed with resident and agree with the resident's findings and plan as documented in the resident's note. 38F h/o sickle cell disease p/w back, b/l hip and leg pain for the past 1 week, refractory to home treatment a/w SCC admitted for pain management.    1. SCC:  -oxygen via n.c, prn  -will place on dilaudid PCA   -will order benadryl 50mg PO q4hrs itching (hold for sedation)  -CXR performed -- will await final read, no e/o acute chest  -if pain not controlled, we can add flexeril prn  -f/u AM labs  -check Hgb electrophoresis  -monitor Hgb    2. SHERWIN:  -baseline unclear; will try to obtain outside records   -strict I/Os, monitor urine output  -trend creatinine  -c/w IVF    3. Elevated FS:  -check HgA1c  -will place on insulin sliding scale for now if repeat FS still elevated

## 2024-04-16 NOTE — CONSULT NOTE ADULT - SUBJECTIVE AND OBJECTIVE BOX
HPI  37yo F PMH sickle cell disease presenting for back, b/l hip and leg pain for the past 1 week. She has been to the ER multiple times this week for pain, was treated with dilaudid then discharged from the ER. She took home dose dilaudid 10 mg PO x1 this morning without improvement before coming to the ER. Pain crises usually occur 6x/year, she goes to the ER, is given dilaudid 6 mg IM/IVP with benadryl 50 mg IVP, and is usually discharged from the ER, unless the pain is so severe that she is admitted for a few days of treatment. States her crises are usually in response to stress or changes in the weather. Believes her current crisis may be due to stress she feels in the setting of multiple recent deaths in her family. States she takes Dilaudid 10 mg q8h at home as needed for pain. Denies history of acute chest syndrome. Is s/p removal of R chest wall port due to line infection 2y ago. She is from [place], which is also where her hematologist is, and she is here in [place] for a family member's . Is due to return to [place] next week. Denies chest pain, shortness of breath, abdominal pain, nausea, vomiting, diarrhea, constipation.    In the ED:  VS: T 97.5 F oral, HR 97, /84, RR 18, SpO2 97% on RA  Labs significant for: Hgb 10.3, MCV 86.9, Plts 427, reticulocytes 3%, BUN/Cr 21/1.97, Tprotein 9.0, Albumin 3.1, protein gap 5.9. A1C 9.9%,   **Cr on repeat 4.35   EKG: RBB w    Imaging:    CXR: no consolidations or infiltrates (provider read)  Interventions: Dilaudid 4 mg IM, Dilaudid 6 mg IM x3, NaCl 1L bolus,  cc/hr  Consults: endo, renal      INTERNAL MEDICINE SERVICE INITIAL CONSULT NOTE    HPI:  37yo F PMH sickle cell disease presenting for back, b/l hip and leg pain for the past 1 week. She has been to the ER multiple times this week for pain, was treated with dilaudid then discharged from the ER. She took home dose dilaudid 10 mg PO x1 this morning without improvement before coming to the ER. Pain crises usually occur 6x/year, she goes to the ER, is given dilaudid 6 mg IM/IVP with benadryl 50 mg IVP, and is usually discharged from the ER, unless the pain is so severe that she is admitted for a few days of treatment. States her crises are usually in response to stress or changes in the weather. Believes her current crisis may be due to stress she feels in the setting of multiple recent deaths in her family. States she takes Dilaudid 10 mg q8h at home as needed for pain. Denies history of acute chest syndrome. Is s/p removal of R chest wall port due to line infection 2y ago. She is from [place], which is also where her hematologist is, and she is here in [place] for a family member's . Is due to return to [place] next week. Denies chest pain, shortness of breath, abdominal pain, nausea, vomiting, diarrhea, constipation.    In the ED:  VS: T 97.5 F oral, HR 97, /84, RR 18, SpO2 97% on RA  Labs significant for: Hgb 10.3, MCV 86.9, Plts 427, reticulocytes 3%, BUN/Cr 21/1.97, Tprotein 9.0, Albumin 3.1, protein gap 5.9  EKG: PENDING  Imaging:    CXR: no consolidations or infiltrates (provider read)  Interventions: Dilaudid 4 mg IM, Dilaudid 6 mg IM x3, NaCl 1L bolus,  cc/hr  Consults: none (2024 08:27)          PAST MEDICAL HISTORY:     PAST SURGICAL HISTORY:    FAMILY HISTORY:    SOCIAL HISTORY:  Tobacco use:  EtOH use:  Illicit drug use:    MEDICATIONS:  MEDICATIONS  (STANDING):  dextrose 10% Bolus 125 milliLiter(s) IV Bolus once  dextrose 5%. 1000 milliLiter(s) (100 mL/Hr) IV Continuous <Continuous>  dextrose 5%. 1000 milliLiter(s) (50 mL/Hr) IV Continuous <Continuous>  dextrose 50% Injectable 25 Gram(s) IV Push once  dextrose 50% Injectable 12.5 Gram(s) IV Push once  folic acid 1 milliGRAM(s) Oral every 24 hours  glucagon  Injectable 1 milliGRAM(s) IntraMuscular once  HYDROmorphone PCA (1 mG/mL) 30 milliLiter(s) PCA Continuous PCA Continuous  hydroxyurea (Non - oncologic) 500 milliGRAM(s) Oral every 12 hours  influenza   Vaccine 0.5 milliLiter(s) IntraMuscular once  insulin glargine Injectable (LANTUS) 25 Unit(s) SubCutaneous at bedtime  insulin lispro (ADMELOG) corrective regimen sliding scale   SubCutaneous Before meals and at bedtime  insulin lispro Injectable (ADMELOG) 5 Unit(s) SubCutaneous three times a day before meals  lactated ringers. 1000 milliLiter(s) (250 mL/Hr) IV Continuous <Continuous>  senna 2 Tablet(s) Oral at bedtime    MEDICATIONS  (PRN):  acetaminophen     Tablet .. 975 milliGRAM(s) Oral every 6 hours PRN Mild Pain (1 - 3)  bisacodyl 5 milliGRAM(s) Oral every 12 hours PRN Constipation  cyclobenzaprine 5 milliGRAM(s) Oral three times a day PRN additional pain control i/s/o sickle cell crisis  dextrose Oral Gel 15 Gram(s) Oral once PRN Blood Glucose LESS THAN 70 milliGRAM(s)/deciliter  diphenhydrAMINE 50 milliGRAM(s) Oral every 4 hours PRN Rash and/or Itching  naloxone Injectable 0.1 milliGRAM(s) IV Push every 3 minutes PRN For ANY of the following changes in patient status:  A. RR LESS THAN 10 breaths per minute, B. Oxygen saturation LESS THAN 90%, C. Sedation score of 6      ALLERGIES:  Allergies    No Known Allergies    Intolerances        VITAL SIGNS:  Vital Signs Last 24 Hrs  T(C): 36.7 (2024 10:10), Max: 37.1 (15 Apr 2024 22:50)  T(F): 98.1 (2024 10:10), Max: 98.7 (15 Apr 2024 22:50)  HR: 101 (2024 10:10) (96 - 106)  BP: 145/82 (2024 10:10) (119/82 - 150/88)  BP(mean): --  RR: 18 (2024 10:10) (16 - 18)  SpO2: 95% (2024 10:10) (93% - 97%)    Parameters below as of 2024 10:10  Patient On (Oxygen Delivery Method): nasal cannula  O2 Flow (L/min): 2      PE  General: obese adult female sitting up in bed uncomfortable-appearing but in NAD and speaking in full sentences  HEENT: head NC/AT, no conjunctival injection, EOMI, MMM  Neck: supple  Cardiac: tachycardic rate regular rhythm, +S1/S2, no murmurs appreciated  Respiratory: lungs CTAB  Abdomen: soft, NT/ND  Extremities: WWP, trace edema b/l LEs  Vascular: 2+ radial and DP pulses b/l  Neuro: A&Ox3, moving all extremities spontaneously  Psych: speech non-pressured, thoughts goal-oriented  MSK: lower back, b/l hips and upper and lower legs TTP    LABS:                        9.6    11.07 )-----------( 405      ( 2024 10:00 )             32.8     04-16    136  |  100  |  29<H>  ----------------------------<  400<H>  4.4   |  24  |  4.35<H>    Ca    9.3      2024 10:00  Phos  5.1     04-16  Mg     2.3     04-16    TPro  9.0<H>  /  Alb  3.1<L>  /  TBili  0.2  /  DBili  x   /  AST  15  /  ALT  21  /  AlkPhos  120  04-15      Urinalysis Basic - ( 2024 10:00 )    Color: x / Appearance: x / SG: x / pH: x  Gluc: 400 mg/dL / Ketone: x  / Bili: x / Urobili: x   Blood: x / Protein: x / Nitrite: x   Leuk Esterase: x / RBC: x / WBC x   Sq Epi: x / Non Sq Epi: x / Bacteria: x      CARDIAC MARKERS ( 2024 10:00 )  x     / x     / 143 U/L / x     / 2.4 ng/mL      CAPILLARY BLOOD GLUCOSE      POCT Blood Glucose.: 315 mg/dL (15 Apr 2024 20:06)  POCT Blood Glucose.: 386 mg/dL (15 Apr 2024 19:03)          RADIOLOGY & ADDITIONAL TESTS: Reviewed. HPI  39yo F PMH sickle cell disease presenting for back, b/l hip and leg pain for the past 1 week. She has been to the ER multiple times this week for pain, was treated with dilaudid then discharged from the ER. She took home dose dilaudid 10 mg PO x1 this morning without improvement before coming to the ER. Pain crises usually occur 6x/year, she goes to the ER, is given dilaudid 6 mg IM/IVP with benadryl 50 mg IVP, and is usually discharged from the ER, unless the pain is so severe that she is admitted for a few days of treatment. States her crises are usually in response to stress or changes in the weather. Believes her current crisis may be due to stress she feels in the setting of multiple recent deaths in her family. States she takes Dilaudid 10 mg q8h at home as needed for pain. Denies history of acute chest syndrome. Is s/p removal of R chest wall port due to line infection 2y ago. She is from [place], which is also where her hematologist is, and she is here in [place] for a family member's . Is due to return to [place] next week. Denies chest pain, shortness of breath, abdominal pain, nausea, vomiting, diarrhea, constipation.    In the ED:  VS: T 97.5 F oral, HR 97, /84, RR 18, SpO2 97% on RA  Labs significant for: Hgb 10.3, MCV 86.9, Plts 427, reticulocytes 3%, BUN/Cr 21/1.97, Tprotein 9.0, Albumin 3.1, protein gap 5.9. A1C 9.9%,   **Cr on repeat 4.35   EKG: RBB w    Imaging:    CXR: no consolidations or infiltrates (provider read)  Interventions: Dilaudid 4 mg IM, Dilaudid 6 mg IM x3, NaCl 1L bolus,  cc/hr  Consults: endo, renal      ICU Consult Note     HPI:  39yo F PMH sickle cell disease presenting for back, b/l hip and leg pain for the past 1 week. She has been to the ER multiple times this week for pain, was treated with dilaudid then discharged from the ER. She took home dose dilaudid 10 mg PO x1 this morning without improvement before coming to the ER. Pain crises usually occur 6x/year, she goes to the ER, is given dilaudid 6 mg IM/IVP with benadryl 50 mg IVP, and is usually discharged from the ER, unless the pain is so severe that she is admitted for a few days of treatment. States her crises are usually in response to stress or changes in the weather. Believes her current crisis may be due to stress she feels in the setting of multiple recent deaths in her family. States she takes Dilaudid 10 mg q8h at home as needed for pain. Denies history of acute chest syndrome. Is s/p removal of R chest wall port due to line infection 2y ago. She is from [place], which is also where her hematologist is, and she is here in [place] for a family member's . Is due to return to [place] next week. Denies chest pain, shortness of breath, abdominal pain, nausea, vomiting, diarrhea, constipation.    ICU consulted as there was transient episode of hypoxia to 92 on RA improved to 98 on RA. Patient was ordered for VQ scan (has SHERWIN) as PE was high on ddx by primary team. Also ordered for spirometer.     In the ED:  VS: T 97.5 F oral, HR 97, /84, RR 18, SpO2 97% on RA  Labs significant for: Hgb 10.3, MCV 86.9, Plts 427, reticulocytes 3%, BUN/Cr 21/1.97, Tprotein 9.0, Albumin 3.1, protein gap 5.9  **Cr worsend to 4 from 1.97, nephro consulted  EKG: RBB  Imaging:    CXR: no consolidations or infiltrates (provider read)  Interventions: Dilaudid 4 mg IM, Dilaudid 6 mg IM x3, NaCl 1L bolus,  cc/hr  Consults: Nephro, Endocrine, MICU (once on floors)          PAST MEDICAL HISTORY:     PAST SURGICAL HISTORY:    FAMILY HISTORY:    SOCIAL HISTORY:  Tobacco use:  EtOH use:  Illicit drug use:    MEDICATIONS:  MEDICATIONS  (STANDING):  dextrose 10% Bolus 125 milliLiter(s) IV Bolus once  dextrose 5%. 1000 milliLiter(s) (100 mL/Hr) IV Continuous <Continuous>  dextrose 5%. 1000 milliLiter(s) (50 mL/Hr) IV Continuous <Continuous>  dextrose 50% Injectable 25 Gram(s) IV Push once  dextrose 50% Injectable 12.5 Gram(s) IV Push once  folic acid 1 milliGRAM(s) Oral every 24 hours  glucagon  Injectable 1 milliGRAM(s) IntraMuscular once  HYDROmorphone PCA (1 mG/mL) 30 milliLiter(s) PCA Continuous PCA Continuous  hydroxyurea (Non - oncologic) 500 milliGRAM(s) Oral every 12 hours  influenza   Vaccine 0.5 milliLiter(s) IntraMuscular once  insulin glargine Injectable (LANTUS) 25 Unit(s) SubCutaneous at bedtime  insulin lispro (ADMELOG) corrective regimen sliding scale   SubCutaneous Before meals and at bedtime  insulin lispro Injectable (ADMELOG) 5 Unit(s) SubCutaneous three times a day before meals  lactated ringers. 1000 milliLiter(s) (250 mL/Hr) IV Continuous <Continuous>  senna 2 Tablet(s) Oral at bedtime    MEDICATIONS  (PRN):  acetaminophen     Tablet .. 975 milliGRAM(s) Oral every 6 hours PRN Mild Pain (1 - 3)  bisacodyl 5 milliGRAM(s) Oral every 12 hours PRN Constipation  cyclobenzaprine 5 milliGRAM(s) Oral three times a day PRN additional pain control i/s/o sickle cell crisis  dextrose Oral Gel 15 Gram(s) Oral once PRN Blood Glucose LESS THAN 70 milliGRAM(s)/deciliter  diphenhydrAMINE 50 milliGRAM(s) Oral every 4 hours PRN Rash and/or Itching  naloxone Injectable 0.1 milliGRAM(s) IV Push every 3 minutes PRN For ANY of the following changes in patient status:  A. RR LESS THAN 10 breaths per minute, B. Oxygen saturation LESS THAN 90%, C. Sedation score of 6      ALLERGIES:  Allergies    No Known Allergies    Intolerances        VITAL SIGNS:  Vital Signs Last 24 Hrs  T(C): 36.7 (2024 10:10), Max: 37.1 (15 Apr 2024 22:50)  T(F): 98.1 (2024 10:10), Max: 98.7 (15 Apr 2024 22:50)  HR: 101 (2024 10:10) (96 - 106)  BP: 145/82 (2024 10:10) (119/82 - 150/88)  BP(mean): --  RR: 18 (2024 10:10) (16 - 18)  SpO2: 95% (2024 10:10) (93% - 97%)    Parameters below as of 2024 10:10  Patient On (Oxygen Delivery Method): nasal cannula  O2 Flow (L/min): 2      PE  General: obese adult female sitting up in bed uncomfortable-appearing but in NAD and speaking in full sentences  HEENT: head NC/AT, no conjunctival injection, EOMI, MMM  Neck: supple  Cardiac: tachycardic rate regular rhythm, +S1/S2, no murmurs appreciated  Respiratory: lungs CTAB  Abdomen: soft, NT/ND  Extremities: WWP, trace edema b/l LEs  Vascular: 2+ radial and DP pulses b/l  Neuro: A&Ox3, moving all extremities spontaneously  Psych: speech non-pressured, thoughts goal-oriented  MSK: lower back, b/l hips and upper and lower legs TTP    LABS:                        9.6    11.07 )-----------( 405      ( 2024 10:00 )             32.8     04-16    136  |  100  |  29<H>  ----------------------------<  400<H>  4.4   |  24  |  4.35<H>    Ca    9.3      2024 10:00  Phos  5.1     04-16  Mg     2.3     04-16    TPro  9.0<H>  /  Alb  3.1<L>  /  TBili  0.2  /  DBili  x   /  AST  15  /  ALT  21  /  AlkPhos  120  04-15      Urinalysis Basic - ( 2024 10:00 )    Color: x / Appearance: x / SG: x / pH: x  Gluc: 400 mg/dL / Ketone: x  / Bili: x / Urobili: x   Blood: x / Protein: x / Nitrite: x   Leuk Esterase: x / RBC: x / WBC x   Sq Epi: x / Non Sq Epi: x / Bacteria: x      CARDIAC MARKERS ( 2024 10:00 )  x     / x     / 143 U/L / x     / 2.4 ng/mL      CAPILLARY BLOOD GLUCOSE      POCT Blood Glucose.: 315 mg/dL (15 Apr 2024 20:06)  POCT Blood Glucose.: 386 mg/dL (15 Apr 2024 19:03)          RADIOLOGY & ADDITIONAL TESTS: Reviewed.

## 2024-04-16 NOTE — PROGRESS NOTE ADULT - ASSESSMENT
37yo F PMH sickle cell disease presenting for back, b/l hip and leg pain for the past 1 week, refractory to home treatment, found to be in sickle cell crisis, admitted for pain management/control and IV hydration. Course c/b hypoxia, acutely worsening SHERWIN, new diagnosis of T2DM and RBBB (unclear whether old or new) stepped up to MICU.     NEURO  Mentating well, AOx3.  - BALTA    CARDS  #Tachycardia  Intermittently elevated HR to low 100s, high 90s, likely iso pain.   - control pain as below  - BALTA    PULM  Satting well on room air.   - c pain as below to prevent shunting    GI  #Nutrition  - CC diet    ENDO  #T2DM  New diagnosis per patient. She is aware she has been hyperglycemic.  - c lantus 25 units qhs, c lispro 5 units pre meal  - c moderate dose sliding scale   - c fingerstick glucose goal is 100-180 mg/dL.  - Given hirsutism and irregular menses will attain total & free testosterone, TSH w/ free T4 to r/o PCOS  - monitor FS  - consistent carb diet  - Obtain A1c    RENAL  #SHERWIN  On admission Cr: 4.35. Likely 2/2 pain crisis, potential pre-renal with component of ATN from sickle cell disease and intravascular volume depletion.  - Avoid nephrotoxic agents  - Adjust medication dosages for level of renal function  - Continue IV fluids, LR at 250cc/hr  - send full urine studies - UA, urine prot/creat, urine lytes  - Check renal US  - No indication for RRT at this time per renal  - Monitor UOP, strict Is and Os  - Maintain MAP >65 for renal perfusion  - outpatient Nephrology follow up on discharge      HEME  #Sickle Cell  #Sickle cell crisis  Occur 6x per year roughly and usually in response to stress. Pain is typically controlled at home with Dilaudid 10 mg PO q8h as needed, Hydroxyurea 500 mg BID. Also takes Folic acid 1 mg qd. Believes her pain crisis this time is 2/2 stress w/ multiple deaths in her family recently. Tried taking her home Dilaudid 10 mg PO at home without improvement. Pain crisis started a few days ago and improved slightly then worsened, which is why she came back to the ER. Has rx in [place] for Dilaudid mg. S/p Dilaudid 6 mg x3 and 4 mg x2 in the ER since arrival at 6:45pm 4/15.   CXR clear in the ER, no concern for acute chest syndrome at this time.  - dilaudid PCA pump for ~2 days, then transition to PO 10 mg q8h once able  - benadryl 50 mg PO q4h PRN for itching  - continue hydroxyurea 500 mg q12h  - continue folic acid 1 mg q24h  - O2 nasal cannula PRN for SpO2 <95%  - incentive spirometry per routine  - senna qhs and dulcolax BID PRN for constipation while on opioids  - continue  cc/hr  - try to obtain collateral from outpatient hematologist    PPX  F: LR 250cc/hr  E: Replete as needed  N: CC diet  Code status: full code 39yo F PMH sickle cell disease presenting for back, b/l hip and leg pain for the past 1 week, refractory to home treatment, found to be in sickle cell crisis, admitted for pain management/control and IV hydration. Course c/b hypoxia, acutely worsening SHERWIN, new diagnosis of T2DM and RBBB (unclear whether old or new) stepped up to MICU.     NEURO  Mentating well, AOx3.  - BALTA    CARDS  #Tachycardia, sinus  Intermittently elevated HR to low 100s, high 90s, likely iso pain.   Normal cardiac enzymes, suggest i/s/o pain crisis. Patient chest pain free.  - control pain as below  - BALTA    PULM  #Transient hypoxia   Patient with transient episode of hypoxia to 92% on room air however now 98% on RA. Suspect etiology likely atelectesis i/s/o splinting as patient in sickle cell pain crisis. Ddx also includes acute chest syndrome -- cxr normal yesterday and patient satting well on room air so less likely. V/Q ordered by primary team for r/o PE i/s/o tachycardia, hypoxia, sickle cell disease  -incentive spirometer  -f/u VQ   -Repeat chest film   - c pain control as below to prevent splinting    GI  #Nutrition  - CC diet    ENDO  #T2DM  New diagnosis per patient. She is aware she has been hyperglycemic.  - c lantus 25 units qhs, c lispro 5 units pre meal  - c moderate dose sliding scale   - c fingerstick glucose goal is 100-180 mg/dL.  - Given hirsutism and irregular menses will attain total & free testosterone, TSH w/ free T4 to r/o PCOS  - monitor FS  - consistent carb diet  - Obtain A1c    RENAL  #SHERWIN  On admission Cr: 4.35. Likely 2/2 pain crisis, potential pre-renal with component of ATN from sickle cell disease and intravascular volume depletion.  Pt reports her kidneys are usually affected when she has a pain crisis and her numbers increase, but that she does not have baseline kidney dysfunction. Cr typically improves with IVF per pt. Baseline Cr unknown. Currently on standing IVF for management of sickle cell pain crisis which will also help if there is a prerenal component.  - de la torre placement and strict I and O monitoring  - Avoid nephrotoxic agents  - Adjust medication dosages for level of renal function  - Continue IV fluids, LR at 250cc/hr  - send full urine studies - UA, urine prot/creat, urine lytes  - Check renal US  - No indication for RRT at this time per renal  - Monitor UOP, strict Is and Os  - Maintain MAP >65 for renal perfusion  - outpatient Nephrology follow up on discharge      HEME  #Sickle Cell  #Sickle cell crisis  Occur 6x per year roughly and usually in response to stress. Pain is typically controlled at home with Dilaudid 10 mg PO q8h as needed, Hydroxyurea 500 mg BID. Also takes Folic acid 1 mg qd. Believes her pain crisis this time is 2/2 stress w/ multiple deaths in her family recently. Tried taking her home Dilaudid 10 mg PO at home without improvement. Pain crisis started a few days ago and improved slightly then worsened, which is why she came back to the ER. Has rx in [place] for Dilaudid mg. S/p Dilaudid 6 mg x3 and 4 mg x2 in the ER since arrival at 6:45pm 4/15.   CXR clear in the ER, no concern for acute chest syndrome at this time.  - Stop dilaudid PCA pump and switch to patient's preferred regimen which she states is 6mg IV dilaudid q3hr and IV 50 benadryl 50Q6  - continue hydroxyurea 500 mg q12h  - continue folic acid 1 mg q24h  - O2 nasal cannula PRN for SpO2 <95%  - incentive spirometry   - senna qhs and dulcolax BID PRN for constipation while on opioids  - continue  cc/hr ordered by primary team  - follow up repeat CBC, retic count, haptoglobin, LDH and sickle cell screen given inability to reach outpatient hematologist at this time  - try to obtain collateral from outpatient hematologist.    #Thrombocytosis.   Plts 427 on admission, elevated from prior ER visits earlier this week. Baseline unknown.   Per review, in sickle cell crisis plts decline initially and then rise during recovery phase. Elevated plt count may indicate pt is in recovery phase.  - no interventions, continue to monitor and manage sickle cell pain crisis as above    PPX  F: LR 250cc/hr  E: Replete as needed  N: CC diet  Code status: full code

## 2024-04-16 NOTE — PATIENT PROFILE ADULT - VISION (WITH CORRECTIVE LENSES IF THE PATIENT USUALLY WEARS THEM):
(M6) obeys commands
Normal vision: sees adequately in most situations; can see medication labels, newsprint

## 2024-04-16 NOTE — H&P ADULT - NSHPPHYSICALEXAM_GEN_ALL_CORE
T(C): 36.3 (04-16-24 @ 05:30), Max: 37.1 (04-15-24 @ 13:48)  HR: 96 (04-16-24 @ 05:30) (96 - 106)  BP: 119/82 (04-16-24 @ 05:30) (119/82 - 169/95)  RR: 18 (04-16-24 @ 05:30) (16 - 18)  SpO2: 95% (04-16-24 @ 05:30) (93% - 97%)    General: obese adult female sitting up in bed uncomfortable-appearing but in NAD and speaking in full sentences  HEENT: head NC/AT, no conjunctival injection, EOMI, MMM  Neck: supple  Cardiac: tachycardic rate regular rhythm, +S1/S2, no murmurs appreciated  Respiratory: lungs CTAB  Abdomen: soft, NT/ND  Extremities: WWP, trace edema b/l LEs  Vascular: 2+ radial and DP pulses b/l  Neuro: A&Ox3, moving all extremities spontaneously  Psych: speech non-pressured, thoughts goal-oriented  MSK: lower back, b/l hips and upper and lower legs TTP

## 2024-04-16 NOTE — H&P ADULT - TIME BILLING

## 2024-04-16 NOTE — H&P ADULT - PROBLEM SELECTOR PLAN 3
Plts 427 on admission, elevated from prior ER visits earlier this week. Baseline value unknown. Per review, in sickle cell crisis plts usually decline initially and then rise during recovery phase. Elevated plt count may indicate pt is in recovery phase.  - no interventions, continue to monitor and manage sickle cell pain crisis as above

## 2024-04-16 NOTE — CONSULT NOTE ADULT - SUBJECTIVE AND OBJECTIVE BOX
NEPHROLOGY SERVICE INITIAL CONSULT NOTE    Hazel Maria is a 37yo F w/ PMH of sickle cell disease presenting with hip and leg pain, admitted with sickle cell pain crisis. Patient reports approx 6 pain crises per year. Visiting [place] from [place] for a . States that she has had prior SHERWIN with pain crises, but does not follow with a Nephrologist. Here started on IV fluids, oxygen, analgesics. Cr noted to rise from 1.97 on admission to 4.35 within one day. No urinary complaints reported. Also found to have new onset DM with A1C 9.9% and uncontrolled hyperglycemia. Hgb 9.6, plt 405k. Nephrology consulted for further management of SHERWIN in setting of sickle cell pain crisis.    REVIEW OF SYSTEMS:   Otherwise negative except as specified in HPI    PAST MEDICAL AND SURGICAL HISTORY:   PAST MEDICAL & SURGICAL HISTORY:  Sickle cell disease      Anemia      CKD (chronic kidney disease)          FAMILY HISTORY:  FAMILY HISTORY:      Otherwise Noncontributory to current admission    SOCIAL HISTORY:  Tobacco use: Denies  EtOH use: Denies  Illicit drug use: Denies    HOME MEDICATIONS:      ACTIVE MEDICATIONS:  MEDICATIONS  (STANDING):  dextrose 10% Bolus 125 milliLiter(s) IV Bolus once  dextrose 5%. 1000 milliLiter(s) (50 mL/Hr) IV Continuous <Continuous>  dextrose 5%. 1000 milliLiter(s) (100 mL/Hr) IV Continuous <Continuous>  dextrose 50% Injectable 25 Gram(s) IV Push once  dextrose 50% Injectable 12.5 Gram(s) IV Push once  folic acid 1 milliGRAM(s) Oral every 24 hours  glucagon  Injectable 1 milliGRAM(s) IntraMuscular once  HYDROmorphone PCA (1 mG/mL) 30 milliLiter(s) PCA Continuous PCA Continuous  hydroxyurea (Non - oncologic) 500 milliGRAM(s) Oral every 12 hours  influenza   Vaccine 0.5 milliLiter(s) IntraMuscular once  insulin glargine Injectable (LANTUS) 25 Unit(s) SubCutaneous at bedtime  insulin lispro (ADMELOG) corrective regimen sliding scale   SubCutaneous Before meals and at bedtime  insulin lispro Injectable (ADMELOG) 5 Unit(s) SubCutaneous three times a day before meals  lactated ringers. 1000 milliLiter(s) (250 mL/Hr) IV Continuous <Continuous>  senna 2 Tablet(s) Oral at bedtime    MEDICATIONS  (PRN):  acetaminophen     Tablet .. 975 milliGRAM(s) Oral every 6 hours PRN Mild Pain (1 - 3)  bisacodyl 5 milliGRAM(s) Oral every 12 hours PRN Constipation  dextrose Oral Gel 15 Gram(s) Oral once PRN Blood Glucose LESS THAN 70 milliGRAM(s)/deciliter  diphenhydrAMINE 50 milliGRAM(s) Oral every 4 hours PRN Rash and/or Itching  naloxone Injectable 0.1 milliGRAM(s) IV Push every 3 minutes PRN For ANY of the following changes in patient status:  A. RR LESS THAN 10 breaths per minute, B. Oxygen saturation LESS THAN 90%, C. Sedation score of 6      ALLERGIES:  Allergies    No Known Allergies    Intolerances        VITAL SIGNS:  Vital Signs Last 24 Hrs  T(C): 36.7 (2024 10:10), Max: 37.1 (15 Apr 2024 22:50)  T(F): 98.1 (2024 10:10), Max: 98.7 (15 Apr 2024 22:50)  HR: 101 (2024 10:10) (96 - 106)  BP: 145/82 (2024 10:10) (119/82 - 150/88)  BP(mean): --  RR: 18 (2024 10:10) (16 - 18)  SpO2: 95% (2024 10:10) (93% - 97%)    Parameters below as of 2024 10:10  Patient On (Oxygen Delivery Method): nasal cannula  O2 Flow (L/min): 2        PHYSICAL EXAM:  Constitutional: NAD, obese, sitting in bed  Head: NCAT, EOMI, NC in place  Respiratory: Diminished breath sounds bilaterally  Cardiac: Regular rate  Gastrointestinal: abdomen soft, NT/ND  Extremities: WWP, bilateral LE edema, tender to palpation  Dermatologic: skin warm, dry and intact; no rashes visible  Neurologic: A&Ox3, moving all extremities    LABS:                        9.6    11.07 )-----------( 405      ( 2024 10:00 )             32.8     04-16    136  |  100  |  29<H>  ----------------------------<  400<H>  4.4   |  24  |  4.35<H>    Ca    9.3      2024 10:00  Phos  5.1     04-16  Mg     2.3     04-16    TPro  9.0<H>  /  Alb  3.1<L>  /  TBili  0.2  /  DBili  x   /  AST  15  /  ALT  21  /  AlkPhos  120  04-15      Urinalysis Basic - ( 2024 10:00 )    Color: x / Appearance: x / SG: x / pH: x  Gluc: 400 mg/dL / Ketone: x  / Bili: x / Urobili: x   Blood: x / Protein: x / Nitrite: x   Leuk Esterase: x / RBC: x / WBC x   Sq Epi: x / Non Sq Epi: x / Bacteria: x          CAPILLARY BLOOD GLUCOSE      POCT Blood Glucose.: 315 mg/dL (15 Apr 2024 20:06)  POCT Blood Glucose.: 386 mg/dL (15 Apr 2024 19:03)          RADIOLOGY & ADDITIONAL TESTS: Reviewed. NEPHROLOGY SERVICE INITIAL CONSULT NOTE    Hazel Maria is a 37yo F w/ PMH of sickle cell disease presenting with hip and leg pain, admitted with sickle cell pain crisis. Patient reports approx 6 pain crises per year. Visiting [place] from [place] for a . States that she has had prior SHERWIN with pain crises, but does not follow with a Nephrologist. Here started on IV fluids, oxygen, analgesics. Cr noted to rise from 1.97 on admission to 4.35 within one day. No urinary complaints reported. Also found to have new onset DM with A1C 9.9% and uncontrolled hyperglycemia. Hgb 9.6, plt 405k. Nephrology consulted for further management of SHERWIN in setting of sickle cell pain crisis. Patient has not had nephrology followup in the past, does now know baseline sCr but thinks it was around 2.     REVIEW OF SYSTEMS:   Otherwise negative except as specified in HPI    PAST MEDICAL AND SURGICAL HISTORY:   PAST MEDICAL & SURGICAL HISTORY:  Sickle cell disease      Anemia      CKD (chronic kidney disease)          FAMILY HISTORY:  FAMILY HISTORY:      Otherwise Noncontributory to current admission    SOCIAL HISTORY:  Tobacco use: Denies  EtOH use: Denies  Illicit drug use: Denies    HOME MEDICATIONS:      ACTIVE MEDICATIONS:  MEDICATIONS  (STANDING):  dextrose 10% Bolus 125 milliLiter(s) IV Bolus once  dextrose 5%. 1000 milliLiter(s) (50 mL/Hr) IV Continuous <Continuous>  dextrose 5%. 1000 milliLiter(s) (100 mL/Hr) IV Continuous <Continuous>  dextrose 50% Injectable 25 Gram(s) IV Push once  dextrose 50% Injectable 12.5 Gram(s) IV Push once  folic acid 1 milliGRAM(s) Oral every 24 hours  glucagon  Injectable 1 milliGRAM(s) IntraMuscular once  HYDROmorphone PCA (1 mG/mL) 30 milliLiter(s) PCA Continuous PCA Continuous  hydroxyurea (Non - oncologic) 500 milliGRAM(s) Oral every 12 hours  influenza   Vaccine 0.5 milliLiter(s) IntraMuscular once  insulin glargine Injectable (LANTUS) 25 Unit(s) SubCutaneous at bedtime  insulin lispro (ADMELOG) corrective regimen sliding scale   SubCutaneous Before meals and at bedtime  insulin lispro Injectable (ADMELOG) 5 Unit(s) SubCutaneous three times a day before meals  lactated ringers. 1000 milliLiter(s) (250 mL/Hr) IV Continuous <Continuous>  senna 2 Tablet(s) Oral at bedtime    MEDICATIONS  (PRN):  acetaminophen     Tablet .. 975 milliGRAM(s) Oral every 6 hours PRN Mild Pain (1 - 3)  bisacodyl 5 milliGRAM(s) Oral every 12 hours PRN Constipation  dextrose Oral Gel 15 Gram(s) Oral once PRN Blood Glucose LESS THAN 70 milliGRAM(s)/deciliter  diphenhydrAMINE 50 milliGRAM(s) Oral every 4 hours PRN Rash and/or Itching  naloxone Injectable 0.1 milliGRAM(s) IV Push every 3 minutes PRN For ANY of the following changes in patient status:  A. RR LESS THAN 10 breaths per minute, B. Oxygen saturation LESS THAN 90%, C. Sedation score of 6      ALLERGIES:  Allergies    No Known Allergies    Intolerances        VITAL SIGNS:  Vital Signs Last 24 Hrs  T(C): 36.7 (2024 10:10), Max: 37.1 (15 Apr 2024 22:50)  T(F): 98.1 (2024 10:10), Max: 98.7 (15 Apr 2024 22:50)  HR: 101 (2024 10:10) (96 - 106)  BP: 145/82 (2024 10:10) (119/82 - 150/88)  BP(mean): --  RR: 18 (2024 10:10) (16 - 18)  SpO2: 95% (2024 10:10) (93% - 97%)    Parameters below as of 2024 10:10  Patient On (Oxygen Delivery Method): nasal cannula  O2 Flow (L/min): 2        PHYSICAL EXAM:  Constitutional: NAD, obese, sitting in bed  Head: NCAT, EOMI, NC in place  Respiratory: Diminished breath sounds bilaterally, no accessory muscle use  Cardiac: Regular rate, no murmur  Gastrointestinal: abdomen soft, NT/ND  Extremities: WWP, bilateral LE edema, tender to palpation  Dermatologic: skin warm, dry and intact; no rashes visible  Neurologic: A&Ox3, moving all extremities, does have some sleepiness but awakes and answers questions appropriately    LABS:                        9.6    11.07 )-----------( 405      ( 2024 10:00 )             32.8     04-16    136  |  100  |  29<H>  ----------------------------<  400<H>  4.4   |  24  |  4.35<H>    Ca    9.3      2024 10:00  Phos  5.1     04-16  Mg     2.3     -16    TPro  9.0<H>  /  Alb  3.1<L>  /  TBili  0.2  /  DBili  x   /  AST  15  /  ALT  21  /  AlkPhos  120  -15      Urinalysis Basic - ( 2024 10:00 )    Color: x / Appearance: x / SG: x / pH: x  Gluc: 400 mg/dL / Ketone: x  / Bili: x / Urobili: x   Blood: x / Protein: x / Nitrite: x   Leuk Esterase: x / RBC: x / WBC x   Sq Epi: x / Non Sq Epi: x / Bacteria: x          CAPILLARY BLOOD GLUCOSE      POCT Blood Glucose.: 315 mg/dL (15 Apr 2024 20:06)  POCT Blood Glucose.: 386 mg/dL (15 Apr 2024 19:03)          RADIOLOGY & ADDITIONAL TESTS: Reviewed.

## 2024-04-16 NOTE — CONSULT NOTE ADULT - ASSESSMENT
# Type 2 diabetes mellitus with hyperglycemia  FHx T2DM from maternal great grandmother including her posterity. New diagnosis per patient. She is aware she has been hyperglycemic but believes her it is caused by her "pain crises". Endorses high calorie diet. BMI 47. >311 s/p 10U Lantus. A1c 9.9%.  - Please keep lantus 25 units at bedtime.   - Keep lispro 5 units before each meal.  - Continue lispro moderate dose sliding scale before meals and at bedtime.  - Patient's fingerstick glucose goal is 100-180 mg/dL.  - Given hirsutism, irregular menses since menarche. Please obtain total & free testosterone, TSH w/ free T4 to r/o PCOS  - Discharge recommendations to be discussed.   - Patient can follow up at discharge with [facility] by calling [phone] to make an appointment.   # Type 2 diabetes mellitus with hyperglycemia  FHx T2DM from maternal great grandmother including her posterity. New diagnosis per patient. She is aware she has been hyperglycemic but believes her it is caused by her "pain crises". Endorses high calorie diet. BMI 47. >311 s/p 10U Lantus. A1c 9.9%.  - Please keep lantus 25 units at bedtime and lispro 5 units before each meal.  - Continue lispro moderate dose sliding scale before meals and at bedtime. Patient's fingerstick glucose goal is 100-180 mg/dL.  - Given hirsutism, irregular menses since menarche. Please obtain total & free testosterone, TSH w/ free T4 to r/o PCOS  - Discharge recommendations to be discussed. As patient is from [place], please ensure she has accessible follow-up on discharge. She is welcome to follow up with [facility] by calling [phone] to make an appointment.

## 2024-04-16 NOTE — ED ADULT NURSE REASSESSMENT NOTE - NS ED NURSE REASSESS COMMENT FT1
DORENE Logan attempted IV by ultrasound multiple times, unable to get successfully.
pt requesting for IM pain medication; reports persistent lower back pain; pt ambulating around in room

## 2024-04-16 NOTE — H&P ADULT - NSHPLABSRESULTS_GEN_ALL_CORE
LABS:                         10.3   9.21  )-----------( 427      ( 15 Apr 2024 14:42 )             33.9     04-15    137  |  102  |  21  ----------------------------<  408<H>  3.9   |  26  |  1.97<H>    Ca    9.1      15 Apr 2024 14:42    TPro  9.0<H>  /  Alb  3.1<L>  /  TBili  0.2  /  DBili  x   /  AST  15  /  ALT  21  /  AlkPhos  120  04-15      Urinalysis Basic - ( 15 Apr 2024 14:42 )    Color: x / Appearance: x / SG: x / pH: x  Gluc: 408 mg/dL / Ketone: x  / Bili: x / Urobili: x   Blood: x / Protein: x / Nitrite: x   Leuk Esterase: x / RBC: x / WBC x   Sq Epi: x / Non Sq Epi: x / Bacteria: x                RADIOLOGY, EKG & ADDITIONAL TESTS:

## 2024-04-16 NOTE — H&P ADULT - ASSESSMENT
39yo F PMH sickle cell disease presenting for back, b/l hip and leg pain for the past 1 week, refractory to home treatment, found to be in sickle cell crisis, admitted for pain management/control and IV hydration.

## 2024-04-16 NOTE — PROGRESS NOTE ADULT - SUBJECTIVE AND OBJECTIVE BOX
*** TRANSFER RMF --->>> MICU ***    HPI/Hospital Course  39yo F PMH sickle cell disease presenting for back, b/l hip and leg pain for the past 1 week. She has been to the ER multiple times this week for pain, was treated with dilaudid then discharged from the ER. She took home dose dilaudid 10 mg PO x1 this morning without improvement before coming to the ER. Pain crises usually occur 6x/year, she goes to the ER, is given dilaudid 6 mg IM/IVP with benadryl 50 mg IVP, and is usually discharged from the ER, unless the pain is so severe that she is admitted for a few days of treatment. States her crises are usually in response to stress or changes in the weather. Believes her current crisis may be due to stress she feels in the setting of multiple recent deaths in her family. States she takes Dilaudid 10 mg q8h at home as needed for pain. Denies history of acute chest syndrome. Is s/p removal of R chest wall port due to line infection 2y ago. She is from [place], which is also where her hematologist is, and she is here in [place] for a family member's . Is due to return to [place] next week. Denies chest pain, shortness of breath, abdominal pain, nausea, vomiting, diarrhea, constipation.    SUBJECTIVE:  -  Patient seen and examined at bedside, comfortable, NAD.    Vital Signs Last 12 Hrs  T(F): 97.4 (24 @ 16:59), Max: 98.1 (24 @ 10:10)  HR: 94 (24 @ 16:59) (94 - 101)  BP: 128/87 (24 @ 16:59) (128/87 - 145/82)  BP(mean): --  RR: 18 (24 @ 16:59) (18 - 18)  SpO2: 98% (24 @ 16:59) (95% - 98%)    I&O's Summary    2024 07:01  -  2024 19:30  --------------------------------------------------------  IN: 0 mL / OUT: 50 mL / NET: -50 mL    PHYSICAL EXAM:  Constitutional: obese adult female sitting up in bed uncomfortable-appearing  HEENT: NC/AT, PERRLA, EOMI, no conjunctival pallor or scleral icterus, MMM  Neck: Supple  Respiratory: CTA B/L. No w/r/r.   Cardiovascular: tachycardic rate regular rhythm, +S1/S2, no murmurs/rubs appreciated  Gastrointestinal: +BS, soft NTND, no guarding or rebound tenderness, no palpable masses   Extremities: wwp; no cyanosis, trace edema b/l LEs  Vascular: Pulses equal and strong throughout.   Neurological: AAOx3, no CN deficits, strength and sensation intact throughout.   Skin: No gross skin abnormalities or rashes    LABS:                        9.6    11.07 )-----------( 405      ( 2024 10:00 )             32.8     04-16    136  |  100  |  29<H>  ----------------------------<  400<H>  4.4   |  24  |  4.35<H>    Ca    9.3      2024 10:00  Phos  5.1     04-16  Mg     2.3     04-16    TPro  9.0<H>  /  Alb  3.1<L>  /  TBili  0.2  /  DBili  x   /  AST  15  /  ALT  21  /  AlkPhos  120  04-15      Urinalysis Basic - ( 2024 19:17 )    Color: Dark Yellow / Appearance: Cloudy / S.024 / pH: x  Gluc: x / Ketone: Trace mg/dL  / Bili: Negative / Urobili: 1.0 mg/dL   Blood: x / Protein: 300 mg/dL / Nitrite: Negative   Leuk Esterase: Negative / RBC: x / WBC x   Sq Epi: x / Non Sq Epi: x / Bacteria: x    RADIOLOGY & ADDITIONAL TESTS:  - reviewed    MEDICATIONS  (STANDING):  dextrose 10% Bolus 125 milliLiter(s) IV Bolus once  dextrose 5%. 1000 milliLiter(s) (50 mL/Hr) IV Continuous <Continuous>  dextrose 5%. 1000 milliLiter(s) (100 mL/Hr) IV Continuous <Continuous>  dextrose 50% Injectable 25 Gram(s) IV Push once  dextrose 50% Injectable 12.5 Gram(s) IV Push once  folic acid 1 milliGRAM(s) Oral every 24 hours  glucagon  Injectable 1 milliGRAM(s) IntraMuscular once  HYDROmorphone  Injectable 6 milliGRAM(s) IV Push every 3 hours  hydroxyurea (Non - oncologic) 500 milliGRAM(s) Oral every 12 hours  influenza   Vaccine 0.5 milliLiter(s) IntraMuscular once  insulin glargine Injectable (LANTUS) 25 Unit(s) SubCutaneous at bedtime  insulin lispro (ADMELOG) corrective regimen sliding scale   SubCutaneous Before meals and at bedtime  insulin lispro Injectable (ADMELOG) 5 Unit(s) SubCutaneous three times a day before meals  lactated ringers. 1000 milliLiter(s) (250 mL/Hr) IV Continuous <Continuous>  senna 2 Tablet(s) Oral at bedtime    MEDICATIONS  (PRN):  acetaminophen     Tablet .. 975 milliGRAM(s) Oral every 6 hours PRN Mild Pain (1 - 3)  bisacodyl 5 milliGRAM(s) Oral every 12 hours PRN Constipation  cyclobenzaprine 5 milliGRAM(s) Oral three times a day PRN additional pain control i/s/o sickle cell crisis  dextrose Oral Gel 15 Gram(s) Oral once PRN Blood Glucose LESS THAN 70 milliGRAM(s)/deciliter  diphenhydrAMINE Injectable 50 milliGRAM(s) IV Push every 6 hours PRN Itching  naloxone Injectable 0.1 milliGRAM(s) IV Push every 3 minutes PRN For ANY of the following changes in patient status:  A. RR LESS THAN 10 breaths per minute, B. Oxygen saturation LESS THAN 90%, C. Sedation score of 6   *** TRANSFER RMF --->>> MICU ***    HPI/Hospital Course  39yo F PMH sickle cell disease presenting for back, b/l hip and leg pain for the past 1 week. She has been to the ER multiple times this week for pain, was treated with dilaudid then discharged from the ER. She took home dose dilaudid 10 mg PO x1 this morning without improvement before coming to the ER. Pain crises usually occur 6x/year, she goes to the ER, is given dilaudid 6 mg IM/IVP with benadryl 50 mg IVP, and is usually discharged from the ER, unless the pain is so severe that she is admitted for a few days of treatment. States her crises are usually in response to stress or changes in the weather. Believes her current crisis may be due to stress she feels in the setting of multiple recent deaths in her family. States she takes Dilaudid 10 mg q8h at home as needed for pain. Denies history of acute chest syndrome. Is s/p removal of R chest wall port due to line infection 2y ago. She is from [place], which is also where her hematologist is, and she is here in [place] for a family member's . Is due to return to [place] next week. Denies chest pain, shortness of breath, abdominal pain, nausea, vomiting, diarrhea, constipation.    SUBJECTIVE:  -  Patient seen and examined at bedside, comfortable, NAD.    Vital Signs Last 12 Hrs  T(F): 97.4 (24 @ 16:59), Max: 98.1 (24 @ 10:10)  HR: 94 (24 @ 16:59) (94 - 101)  BP: 128/87 (24 @ 16:59) (128/87 - 145/82)  BP(mean): --  RR: 18 (24 @ 16:59) (18 - 18)  SpO2: 98% (24 @ 16:59) (95% - 98%)    I&O's Summary    2024 07:01  -  2024 19:30  --------------------------------------------------------  IN: 0 mL / OUT: 50 mL / NET: -50 mL    PHYSICAL EXAM:  Constitutional: obese adult female sitting up in bed uncomfortable-appearing  HEENT: NC/AT, PERRLA, EOMI, no conjunctival pallor or scleral icterus, MMM  Neck: Supple  Respiratory: CTA B/L. No w/r/r.   Cardiovascular: tachycardic rate regular rhythm, +S1/S2, no murmurs/rubs appreciated  Gastrointestinal: +BS, soft NTND, no guarding or rebound tenderness, no palpable masses   Extremities: wwp; no cyanosis, trace edema b/l LEs  MSK: lower back, b/l hips and upper and lower legs TTP  Vascular: Pulses equal and strong throughout.   Neurological: AAOx3, no CN deficits, strength and sensation intact throughout.   Skin: No gross skin abnormalities or rashes    LABS:                        9.6    11.07 )-----------( 405      ( 2024 10:00 )             32.8     04-16    136  |  100  |  29<H>  ----------------------------<  400<H>  4.4   |  24  |  4.35<H>    Ca    9.3      2024 10:00  Phos  5.1     04-16  Mg     2.3     04-16    TPro  9.0<H>  /  Alb  3.1<L>  /  TBili  0.2  /  DBili  x   /  AST  15  /  ALT  21  /  AlkPhos  120  04-15      Urinalysis Basic - ( 2024 19:17 )    Color: Dark Yellow / Appearance: Cloudy / S.024 / pH: x  Gluc: x / Ketone: Trace mg/dL  / Bili: Negative / Urobili: 1.0 mg/dL   Blood: x / Protein: 300 mg/dL / Nitrite: Negative   Leuk Esterase: Negative / RBC: x / WBC x   Sq Epi: x / Non Sq Epi: x / Bacteria: x    RADIOLOGY & ADDITIONAL TESTS:  - reviewed    MEDICATIONS  (STANDING):  dextrose 10% Bolus 125 milliLiter(s) IV Bolus once  dextrose 5%. 1000 milliLiter(s) (50 mL/Hr) IV Continuous <Continuous>  dextrose 5%. 1000 milliLiter(s) (100 mL/Hr) IV Continuous <Continuous>  dextrose 50% Injectable 25 Gram(s) IV Push once  dextrose 50% Injectable 12.5 Gram(s) IV Push once  folic acid 1 milliGRAM(s) Oral every 24 hours  glucagon  Injectable 1 milliGRAM(s) IntraMuscular once  HYDROmorphone  Injectable 6 milliGRAM(s) IV Push every 3 hours  hydroxyurea (Non - oncologic) 500 milliGRAM(s) Oral every 12 hours  influenza   Vaccine 0.5 milliLiter(s) IntraMuscular once  insulin glargine Injectable (LANTUS) 25 Unit(s) SubCutaneous at bedtime  insulin lispro (ADMELOG) corrective regimen sliding scale   SubCutaneous Before meals and at bedtime  insulin lispro Injectable (ADMELOG) 5 Unit(s) SubCutaneous three times a day before meals  lactated ringers. 1000 milliLiter(s) (250 mL/Hr) IV Continuous <Continuous>  senna 2 Tablet(s) Oral at bedtime    MEDICATIONS  (PRN):  acetaminophen     Tablet .. 975 milliGRAM(s) Oral every 6 hours PRN Mild Pain (1 - 3)  bisacodyl 5 milliGRAM(s) Oral every 12 hours PRN Constipation  cyclobenzaprine 5 milliGRAM(s) Oral three times a day PRN additional pain control i/s/o sickle cell crisis  dextrose Oral Gel 15 Gram(s) Oral once PRN Blood Glucose LESS THAN 70 milliGRAM(s)/deciliter  diphenhydrAMINE Injectable 50 milliGRAM(s) IV Push every 6 hours PRN Itching  naloxone Injectable 0.1 milliGRAM(s) IV Push every 3 minutes PRN For ANY of the following changes in patient status:  A. RR LESS THAN 10 breaths per minute, B. Oxygen saturation LESS THAN 90%, C. Sedation score of 6

## 2024-04-16 NOTE — CONSULT NOTE ADULT - ASSESSMENT
37yo F w/ PMH of sickle cell disease presenting with hip and leg pain, admitted with sickle cell pain crisis. Patient reports approx 6 pain crises per year. Visiting [place] from [place] for a . States that she has had prior SHERWIN with pain crises, but does not follow with a Nephrologist. Here started on IV fluids, oxygen, analgesics. Cr noted to rise from 1.97 on admission to 4.35 within one day. No urinary complaints reported. Also found to have new onset DM with A1C 9.9% and uncontrolled hyperglycemia. Hgb 9.6, plt 405k. Nephrology consulted for further management of SHERWIN in setting of sickle cell pain crisis.    SHERWIN in setting of pain crisis, suspect pre-renal possibly with component of ATN from sickle cell disease and intravascular volume depletion  - Continue aggressive IV fluids, on LR at 150cc/hr  - Supplemental oxygen  - PRN analgesia  - Please send full urine studies - UA, urine prot/creat, urine lytes  - Check renal US  - No indication for RRT at this time  - Monitor UOP, needs accurate outs  - Further management of sickle cell crisis per primary team  - Endocrinology following for diabetes  - Avoid nephrotoxic meds  - Maintain MAP >65 for renal perfusion  - Will need outpatient Nephrology follow up on discharge 39yo F w/ PMH of sickle cell disease presenting with hip and leg pain, admitted with sickle cell pain crisis. Patient reports approx 6 pain crises per year. Visiting [place] from [place] for a . States that she has had prior SHERWIN with pain crises, but does not follow with a Nephrologist. Here started on IV fluids, oxygen, analgesics. Cr noted to rise from 1.97 on admission to 4.35 within one day. No urinary complaints reported. Also found to have new onset DM with A1C 9.9% and uncontrolled hyperglycemia. Hgb 9.6, plt 405k. Nephrology consulted for further management of SHERWIN in setting of sickle cell pain crisis.    SHERWIN in setting of pain crisis, suspect pre-renal possibly with component of ATN from sickle cell disease and intravascular volume depletion  - Continue aggressive IV fluids, on LR at 150cc/hr  - Supplemental oxygen  - PRN analgesia  - Please send full urine studies - UA, urine prot/creat, urine lytes  - Check renal US  - No indication for RRT at this time  - Monitor UOP, needs accurate outs  - Further management of sickle cell crisis per primary team  - Endocrinology following for diabetes  - Avoid nephrotoxic meds  - Maintain MAP >65 for renal perfusion  - Will need outpatient Nephrology follow up on discharge    ADDENDUM:  Strict I/Os, monitoring sedation with narcan available, at least BID lytes, frequent volume checks to ensure no worsening fluid overload

## 2024-04-16 NOTE — H&P ADULT - HISTORY OF PRESENT ILLNESS
37yo F PMH sickle cell disease presenting for back, b/l hip and leg pain for the past 1 week. She has been to the ER multiple times this week for pain, was treated with dilaudid then discharged from the ER. She took home dose dilaudid 10 mg PO x1 this morning without improvement before coming to the ER. Pain crises usually occur 6x/year, she goes to the ER, is given dilaudid 6 mg IM/IVP with benadryl 50 mg IVP, and is usually discharged from the ER, unless the pain is so severe that she is admitted for a few days of treatment. States her crises are usually in response to stress or changes in the weather. Believes her current crisis may be due to stress she feels in the setting of multiple recent deaths in her family. States she takes Dilaudid 10 mg q8h at home as needed for pain. Denies history of acute chest syndrome. Is s/p removal of R chest wall port due to line infection 2y ago. She is from [place], which is also where her hematologist is, and she is here in [place] for a family member's . Is due to return to [place] next week. Denies chest pain, shortness of breath, abdominal pain, nausea, vomiting, diarrhea, constipation.    In the ED:  VS: T 97.5 F oral, HR 97, /84, RR 18, SpO2 97% on RA  Labs significant for: Hgb 10.3, MCV 86.9, Plts 427, reticulocytes 3%, BUN/Cr 21/1.97, Tprotein 9.0, Albumin 3.1, protein gap 5.9  EKG: PENDING  Imaging:    CXR: no consolidations or infiltrates (provider read)  Interventions: Dilaudid 4 mg IM, Dilaudid 6 mg IM x3, NaCl 1L bolus, LR 1000 cc/hr  Consults: none 39yo F PMH sickle cell disease presenting for back, b/l hip and leg pain for the past 1 week. She has been to the ER multiple times this week for pain, was treated with dilaudid then discharged from the ER. She took home dose dilaudid 10 mg PO x1 this morning without improvement before coming to the ER. Pain crises usually occur 6x/year, she goes to the ER, is given dilaudid 6 mg IM/IVP with benadryl 50 mg IVP, and is usually discharged from the ER, unless the pain is so severe that she is admitted for a few days of treatment. States her crises are usually in response to stress or changes in the weather. Believes her current crisis may be due to stress she feels in the setting of multiple recent deaths in her family. States she takes Dilaudid 10 mg q8h at home as needed for pain. Denies history of acute chest syndrome. Is s/p removal of R chest wall port due to line infection 2y ago. She is from [place], which is also where her hematologist is, and she is here in [place] for a family member's . Is due to return to [place] next week. Denies chest pain, shortness of breath, abdominal pain, nausea, vomiting, diarrhea, constipation.    In the ED:  VS: T 97.5 F oral, HR 97, /84, RR 18, SpO2 97% on RA  Labs significant for: Hgb 10.3, MCV 86.9, Plts 427, reticulocytes 3%, BUN/Cr 21/1.97, Tprotein 9.0, Albumin 3.1, protein gap 5.9  EKG: PENDING  Imaging:    CXR: no consolidations or infiltrates (provider read)  Interventions: Dilaudid 4 mg IM, Dilaudid 6 mg IM x3, NaCl 1L bolus,  cc/hr  Consults: none

## 2024-04-16 NOTE — CONSULT NOTE ADULT - ASSESSMENT
39yo F PMH sickle cell disease presenting for back, b/l hip and leg pain for the past 1 week, refractory to home treatment, found to be in sickle cell crisis, admitted for pain management/control and IV hydration. ICU consulted for transient episode of hypoxia as well as need for better monitoring of fluid status.     Neuro  AAOx3    Cards  #Sinus tachycardia  Normal cardiac enzymes, suggest i/s/o pain crisis. Patient chest pain free.     Pulm  #Transient hypoxia   Patient with transient episode of hypoxia to 92% on room air however now 98% on RA. Suspect etiology likely atelectesis i/s/o splinting as patient in sickle cell pain crisis. Ddx also includes acute chest syndrome -- cxr normal yesterday and patient satting well on room air so less likely. V/Q ordered by primary team for r/o PE i/s/o tachycardia, hypoxia, sickle cell disease  -incentive spirometer  -f/u VQ   -Repeat chest film     GI  no active issues     Heme   #Sickle cell pain crisis.   Plan: Occur ~6x/year usually in response to stress or changes in the weather. Pain is typically controlled at home with Dilaudid 10 mg PO q8h as needed, Hydroxyurea 500 mg BID. Also takes Folic acid 1 mg qd. Believes her pain crisis this time is 2/2 stress as there have been multiple deaths in her family recently. Tried taking her home Dilaudid 10 mg PO at home without improvement. Pain crisis started a few days ago and improved slightly but then worsened, which is why she came back to the ER. Has rx in [place] for Dilaudid mg. S/p Dilaudid 6 mg x3 and 4 mg x2 in the ER since arrival at 6:45pm 4/15. CXR clear in the ER, no concern for acute chest syndrome at this time.  - Stop dilaudid PCA pump and switch to patient's preferred regimen which she states is 6mg IV dilaudid q3hr and IV 50 benadryl 50Q6  - continue hydroxyurea 500 mg q12h  - continue folic acid 1 mg q24h  - O2 nasal cannula PRN for SpO2 <95%  - incentive spirometry   - senna qhs and dulcolax BID PRN for constipation while on opioids  - continue  cc/hr ordered by primary team  - follow up repeat CBC, retic count, haptoglobin, LDH and sickle cell screen given inability to reach outpatient hematologist at this time  - try to obtain collateral from outpatient hematologist.    #Thrombocytosis.   ·  Plan: Plts 427 on admission, elevated from prior ER visits earlier this week. Baseline value unknown. Per review, in sickle cell crisis plts usually decline initially and then rise during recovery phase. Elevated plt count may indicate pt is in recovery phase.  - no interventions, continue to monitor and manage sickle cell pain crisis as above.    Renal  SHERWIN (acute kidney injury).   ·  Plan: vs CKD vs SHERWIN on CKD  Pt reports her kidneys are usually affected when she has a pain crisis and her numbers increase, but that she does not have baseline kidney dysfunction. Cr typically improves with IVF per pt. Baseline Cr unknown. Cr 1.97 on admission, lower than Cr 2.1-2.8 when she was in the ER earlier this week. Currently on standing IVF for management of sickle cell pain crisis which will also help if there is a prerenal component. Cr reassuringly downtrending from prior values earlier this week, however GFR 30s. Low utility for Ulytes given that pt received a bolus and has been on standing IVF.  -Will move to MICU for de la torre placement and strict I and O monitoring. Patient just urinated 50cc however unclear how much she has done for the day--states she has gone to bathroom 3x today but needs better I/O monitoring.   -bladder scan  -repeat chem panel once arrives in unit  - continue IVF 250cc/hr and monitor Cr.  -f/u nephro recs    Endo  # Type 2 diabetes mellitus with hyperglycemia  FHx T2DM from maternal great grandmother including her posterity. New diagnosis per patient. She is aware she has been hyperglycemic but believes her it is caused by her "pain crises". Endorses high calorie diet. BMI 47. >311 s/p 10U Lantus. A1c 9.9%.  - Per Endo recs--Please keep lantus 25 units at bedtime and lispro 5 units before each meal.  - Continue lispro moderate dose sliding scale before meals and at bedtime. Patient's fingerstick glucose goal is 100-180 mg/dL.      Dispo: MICU

## 2024-04-16 NOTE — CONSULT NOTE ADULT - CONSULT REQUESTED BY NAME
primary team
Vira Waters)
Medicine

## 2024-04-16 NOTE — H&P ADULT - PROBLEM SELECTOR PLAN 5
F:  cc/hr   E: replenish PRN  N: regular diet  GI ppx: none  DVT ppx: SCDs   Dispo: RM[facility] --> home Tprotein elevated, with protein gap 5.9. Per review, sickle cell patients have elevated levels of globulin and IgM which contribute to gap. No reported history of HIV or Hep B/C.  - no interventions

## 2024-04-17 DIAGNOSIS — D72.829 ELEVATED WHITE BLOOD CELL COUNT, UNSPECIFIED: ICD-10-CM

## 2024-04-17 DIAGNOSIS — M54.9 DORSALGIA, UNSPECIFIED: ICD-10-CM

## 2024-04-17 DIAGNOSIS — J96.01 ACUTE RESPIRATORY FAILURE WITH HYPOXIA: ICD-10-CM

## 2024-04-17 DIAGNOSIS — E11.9 TYPE 2 DIABETES MELLITUS WITHOUT COMPLICATIONS: ICD-10-CM

## 2024-04-17 DIAGNOSIS — D64.9 ANEMIA, UNSPECIFIED: ICD-10-CM

## 2024-04-17 LAB
ADD ON TEST-SPECIMEN IN LAB: SIGNIFICANT CHANGE UP
ANION GAP SERPL CALC-SCNC: 11 MMOL/L — SIGNIFICANT CHANGE UP (ref 5–17)
ANION GAP SERPL CALC-SCNC: 12 MMOL/L — SIGNIFICANT CHANGE UP (ref 5–17)
APPEARANCE UR: CLEAR — SIGNIFICANT CHANGE UP
BACTERIA # UR AUTO: NEGATIVE /HPF — SIGNIFICANT CHANGE UP
BASOPHILS # BLD AUTO: 0.04 K/UL — SIGNIFICANT CHANGE UP (ref 0–0.2)
BASOPHILS NFR BLD AUTO: 0.4 % — SIGNIFICANT CHANGE UP (ref 0–2)
BILIRUB UR-MCNC: NEGATIVE — SIGNIFICANT CHANGE UP
BUN SERPL-MCNC: 39 MG/DL — HIGH (ref 7–23)
BUN SERPL-MCNC: 41 MG/DL — HIGH (ref 7–23)
CALCIUM SERPL-MCNC: 9 MG/DL — SIGNIFICANT CHANGE UP (ref 8.4–10.5)
CALCIUM SERPL-MCNC: 9.4 MG/DL — SIGNIFICANT CHANGE UP (ref 8.4–10.5)
CAST: 21 /LPF — HIGH (ref 0–4)
CHLORIDE SERPL-SCNC: 101 MMOL/L — SIGNIFICANT CHANGE UP (ref 96–108)
CHLORIDE SERPL-SCNC: 104 MMOL/L — SIGNIFICANT CHANGE UP (ref 96–108)
CK SERPL-CCNC: 133 U/L — SIGNIFICANT CHANGE UP (ref 25–170)
CO2 SERPL-SCNC: 23 MMOL/L — SIGNIFICANT CHANGE UP (ref 22–31)
CO2 SERPL-SCNC: 24 MMOL/L — SIGNIFICANT CHANGE UP (ref 22–31)
COLOR SPEC: YELLOW — SIGNIFICANT CHANGE UP
CREAT ?TM UR-MCNC: 165 MG/DL — SIGNIFICANT CHANGE UP
CREAT ?TM UR-MCNC: 225 MG/DL — SIGNIFICANT CHANGE UP
CREAT SERPL-MCNC: 3.81 MG/DL — HIGH (ref 0.5–1.3)
CREAT SERPL-MCNC: 4.48 MG/DL — HIGH (ref 0.5–1.3)
CRP SERPL-MCNC: 15.4 MG/L — HIGH (ref 0–4)
CYSTATIN C SERPL-MCNC: 2.83 MG/L — HIGH (ref 0.59–0.98)
DIFF PNL FLD: ABNORMAL
EGFR: 12 ML/MIN/1.73M2 — LOW
EGFR: 15 ML/MIN/1.73M2 — LOW
EOSINOPHIL # BLD AUTO: 0.45 K/UL — SIGNIFICANT CHANGE UP (ref 0–0.5)
EOSINOPHIL NFR BLD AUTO: 4.3 % — SIGNIFICANT CHANGE UP (ref 0–6)
FINE GRAN CASTS #/AREA URNS AUTO: PRESENT
GFR/BSA.PRED SERPLBLD CYS-BASED-ARV: 20 ML/MIN/1.73M2 — LOW
GLUCOSE BLDC GLUCOMTR-MCNC: 202 MG/DL — HIGH (ref 70–99)
GLUCOSE BLDC GLUCOMTR-MCNC: 244 MG/DL — HIGH (ref 70–99)
GLUCOSE BLDC GLUCOMTR-MCNC: 268 MG/DL — HIGH (ref 70–99)
GLUCOSE BLDC GLUCOMTR-MCNC: 280 MG/DL — HIGH (ref 70–99)
GLUCOSE BLDC GLUCOMTR-MCNC: 298 MG/DL — HIGH (ref 70–99)
GLUCOSE SERPL-MCNC: 227 MG/DL — HIGH (ref 70–99)
GLUCOSE SERPL-MCNC: 274 MG/DL — HIGH (ref 70–99)
GLUCOSE UR QL: >=1000 MG/DL
HCT VFR BLD CALC: 27.4 % — LOW (ref 34.5–45)
HEMOGLOBIN INTERPRETATION: SIGNIFICANT CHANGE UP
HGB A MFR BLD: 97.3 % — SIGNIFICANT CHANGE UP (ref 95.8–98)
HGB A2 MFR BLD: 2.7 % — SIGNIFICANT CHANGE UP (ref 2–3.2)
HGB BLD-MCNC: 8.1 G/DL — LOW (ref 11.5–15.5)
IMM GRANULOCYTES NFR BLD AUTO: 0.4 % — SIGNIFICANT CHANGE UP (ref 0–0.9)
KETONES UR-MCNC: ABNORMAL MG/DL
LEUKOCYTE ESTERASE UR-ACNC: NEGATIVE — SIGNIFICANT CHANGE UP
LYMPHOCYTES # BLD AUTO: 3.36 K/UL — HIGH (ref 1–3.3)
LYMPHOCYTES # BLD AUTO: 31.8 % — SIGNIFICANT CHANGE UP (ref 13–44)
MAGNESIUM SERPL-MCNC: 2.1 MG/DL — SIGNIFICANT CHANGE UP (ref 1.6–2.6)
MCHC RBC-ENTMCNC: 27 PG — SIGNIFICANT CHANGE UP (ref 27–34)
MCHC RBC-ENTMCNC: 29.6 GM/DL — LOW (ref 32–36)
MCV RBC AUTO: 91.3 FL — SIGNIFICANT CHANGE UP (ref 80–100)
MONOCYTES # BLD AUTO: 0.88 K/UL — SIGNIFICANT CHANGE UP (ref 0–0.9)
MONOCYTES NFR BLD AUTO: 8.3 % — SIGNIFICANT CHANGE UP (ref 2–14)
NEUTROPHILS # BLD AUTO: 5.79 K/UL — SIGNIFICANT CHANGE UP (ref 1.8–7.4)
NEUTROPHILS NFR BLD AUTO: 54.8 % — SIGNIFICANT CHANGE UP (ref 43–77)
NITRITE UR-MCNC: NEGATIVE — SIGNIFICANT CHANGE UP
NRBC # BLD: 0 /100 WBCS — SIGNIFICANT CHANGE UP (ref 0–0)
NT-PROBNP SERPL-SCNC: 187 PG/ML — SIGNIFICANT CHANGE UP (ref 0–300)
OSMOLALITY UR: 511 MOSM/KG — SIGNIFICANT CHANGE UP (ref 300–900)
OSMOLALITY UR: 593 MOSM/KG — SIGNIFICANT CHANGE UP (ref 300–900)
PH UR: 5.5 — SIGNIFICANT CHANGE UP (ref 5–8)
PHOSPHATE SERPL-MCNC: 5.9 MG/DL — HIGH (ref 2.5–4.5)
PLATELET # BLD AUTO: 303 K/UL — SIGNIFICANT CHANGE UP (ref 150–400)
POTASSIUM SERPL-MCNC: 4.4 MMOL/L — SIGNIFICANT CHANGE UP (ref 3.5–5.3)
POTASSIUM SERPL-MCNC: 4.4 MMOL/L — SIGNIFICANT CHANGE UP (ref 3.5–5.3)
POTASSIUM SERPL-SCNC: 4.4 MMOL/L — SIGNIFICANT CHANGE UP (ref 3.5–5.3)
POTASSIUM SERPL-SCNC: 4.4 MMOL/L — SIGNIFICANT CHANGE UP (ref 3.5–5.3)
PROCALCITONIN SERPL-MCNC: 0.14 NG/ML — HIGH (ref 0.02–0.1)
PROT UR-MCNC: 100 MG/DL
RBC # BLD: 3 M/UL — LOW (ref 3.8–5.2)
RBC # FLD: 16.5 % — HIGH (ref 10.3–14.5)
RBC CASTS # UR COMP ASSIST: 2 /HPF — SIGNIFICANT CHANGE UP (ref 0–4)
SODIUM SERPL-SCNC: 135 MMOL/L — SIGNIFICANT CHANGE UP (ref 135–145)
SODIUM SERPL-SCNC: 140 MMOL/L — SIGNIFICANT CHANGE UP (ref 135–145)
SODIUM UR-SCNC: 21 MMOL/L — SIGNIFICANT CHANGE UP
SODIUM UR-SCNC: 35 MMOL/L — SIGNIFICANT CHANGE UP
SP GR SPEC: 1.02 — SIGNIFICANT CHANGE UP (ref 1–1.03)
SQUAMOUS # UR AUTO: 4 /HPF — SIGNIFICANT CHANGE UP (ref 0–5)
T4 FREE SERPL-MCNC: 1.5 NG/DL — SIGNIFICANT CHANGE UP (ref 0.93–1.7)
TESTOST FREE+TOTAL PANEL SERPL-MCNC: 15.3 NG/DL — SIGNIFICANT CHANGE UP (ref 8.4–48.1)
TSH SERPL-MCNC: 1.69 UIU/ML — SIGNIFICANT CHANGE UP (ref 0.27–4.2)
UROBILINOGEN FLD QL: 0.2 MG/DL — SIGNIFICANT CHANGE UP (ref 0.2–1)
UUN UR-MCNC: 693 MG/DL — SIGNIFICANT CHANGE UP
WBC # BLD: 10.56 K/UL — HIGH (ref 3.8–10.5)
WBC # FLD AUTO: 10.56 K/UL — HIGH (ref 3.8–10.5)
WBC UR QL: 2 /HPF — SIGNIFICANT CHANGE UP (ref 0–5)

## 2024-04-17 PROCEDURE — 36415 COLL VENOUS BLD VENIPUNCTURE: CPT

## 2024-04-17 PROCEDURE — 99232 SBSQ HOSP IP/OBS MODERATE 35: CPT | Mod: GC

## 2024-04-17 PROCEDURE — 99233 SBSQ HOSP IP/OBS HIGH 50: CPT

## 2024-04-17 PROCEDURE — 36000 PLACE NEEDLE IN VEIN: CPT

## 2024-04-17 PROCEDURE — 99233 SBSQ HOSP IP/OBS HIGH 50: CPT | Mod: GC

## 2024-04-17 PROCEDURE — 93306 TTE W/DOPPLER COMPLETE: CPT | Mod: 26

## 2024-04-17 PROCEDURE — 76937 US GUIDE VASCULAR ACCESS: CPT | Mod: 26

## 2024-04-17 RX ORDER — HYDROMORPHONE HYDROCHLORIDE 2 MG/ML
3 INJECTION INTRAMUSCULAR; INTRAVENOUS; SUBCUTANEOUS EVERY 4 HOURS
Refills: 0 | Status: DISCONTINUED | OUTPATIENT
Start: 2024-04-17 | End: 2024-04-18

## 2024-04-17 RX ORDER — INSULIN LISPRO 100/ML
12 VIAL (ML) SUBCUTANEOUS
Refills: 0 | Status: DISCONTINUED | OUTPATIENT
Start: 2024-04-17 | End: 2024-04-18

## 2024-04-17 RX ORDER — INSULIN GLARGINE 100 [IU]/ML
36 INJECTION, SOLUTION SUBCUTANEOUS AT BEDTIME
Refills: 0 | Status: DISCONTINUED | OUTPATIENT
Start: 2024-04-17 | End: 2024-04-18

## 2024-04-17 RX ORDER — SODIUM CHLORIDE 9 MG/ML
1000 INJECTION, SOLUTION INTRAVENOUS
Refills: 0 | Status: DISCONTINUED | OUTPATIENT
Start: 2024-04-17 | End: 2024-04-18

## 2024-04-17 RX ORDER — DIPHENHYDRAMINE HCL 50 MG
50 CAPSULE ORAL EVERY 6 HOURS
Refills: 0 | Status: DISCONTINUED | OUTPATIENT
Start: 2024-04-17 | End: 2024-04-18

## 2024-04-17 RX ORDER — HYDROMORPHONE HYDROCHLORIDE 2 MG/ML
4 INJECTION INTRAMUSCULAR; INTRAVENOUS; SUBCUTANEOUS EVERY 4 HOURS
Refills: 0 | Status: DISCONTINUED | OUTPATIENT
Start: 2024-04-17 | End: 2024-04-17

## 2024-04-17 RX ADMIN — HYDROMORPHONE HYDROCHLORIDE 3 MILLIGRAM(S): 2 INJECTION INTRAMUSCULAR; INTRAVENOUS; SUBCUTANEOUS at 23:46

## 2024-04-17 RX ADMIN — HYDROMORPHONE HYDROCHLORIDE 6 MILLIGRAM(S): 2 INJECTION INTRAMUSCULAR; INTRAVENOUS; SUBCUTANEOUS at 04:26

## 2024-04-17 RX ADMIN — Medication 50 MILLIGRAM(S): at 10:43

## 2024-04-17 RX ADMIN — HEPARIN SODIUM 7500 UNIT(S): 5000 INJECTION INTRAVENOUS; SUBCUTANEOUS at 22:25

## 2024-04-17 RX ADMIN — HYDROXYUREA 500 MILLIGRAM(S): 500 CAPSULE ORAL at 11:18

## 2024-04-17 RX ADMIN — HYDROMORPHONE HYDROCHLORIDE 3 MILLIGRAM(S): 2 INJECTION INTRAMUSCULAR; INTRAVENOUS; SUBCUTANEOUS at 19:33

## 2024-04-17 RX ADMIN — HYDROMORPHONE HYDROCHLORIDE 6 MILLIGRAM(S): 2 INJECTION INTRAMUSCULAR; INTRAVENOUS; SUBCUTANEOUS at 07:13

## 2024-04-17 RX ADMIN — HYDROMORPHONE HYDROCHLORIDE 4 MILLIGRAM(S): 2 INJECTION INTRAMUSCULAR; INTRAVENOUS; SUBCUTANEOUS at 16:50

## 2024-04-17 RX ADMIN — Medication 50 MILLIGRAM(S): at 16:50

## 2024-04-17 RX ADMIN — HYDROMORPHONE HYDROCHLORIDE 6 MILLIGRAM(S): 2 INJECTION INTRAMUSCULAR; INTRAVENOUS; SUBCUTANEOUS at 01:13

## 2024-04-17 RX ADMIN — Medication 50 MILLIGRAM(S): at 04:26

## 2024-04-17 RX ADMIN — HEPARIN SODIUM 7500 UNIT(S): 5000 INJECTION INTRAVENOUS; SUBCUTANEOUS at 14:57

## 2024-04-17 RX ADMIN — INSULIN GLARGINE 36 UNIT(S): 100 INJECTION, SOLUTION SUBCUTANEOUS at 22:45

## 2024-04-17 RX ADMIN — HYDROMORPHONE HYDROCHLORIDE 6 MILLIGRAM(S): 2 INJECTION INTRAMUSCULAR; INTRAVENOUS; SUBCUTANEOUS at 10:50

## 2024-04-17 RX ADMIN — HYDROMORPHONE HYDROCHLORIDE 6 MILLIGRAM(S): 2 INJECTION INTRAMUSCULAR; INTRAVENOUS; SUBCUTANEOUS at 08:26

## 2024-04-17 RX ADMIN — HYDROMORPHONE HYDROCHLORIDE 6 MILLIGRAM(S): 2 INJECTION INTRAMUSCULAR; INTRAVENOUS; SUBCUTANEOUS at 14:19

## 2024-04-17 RX ADMIN — HYDROMORPHONE HYDROCHLORIDE 6 MILLIGRAM(S): 2 INJECTION INTRAMUSCULAR; INTRAVENOUS; SUBCUTANEOUS at 04:56

## 2024-04-17 RX ADMIN — SODIUM CHLORIDE 150 MILLILITER(S): 9 INJECTION, SOLUTION INTRAVENOUS at 18:18

## 2024-04-17 RX ADMIN — Medication 5 UNIT(S): at 12:34

## 2024-04-17 RX ADMIN — Medication 1 MILLIGRAM(S): at 11:19

## 2024-04-17 RX ADMIN — Medication 5 UNIT(S): at 06:13

## 2024-04-17 RX ADMIN — Medication 4: at 22:45

## 2024-04-17 RX ADMIN — Medication 6: at 17:33

## 2024-04-17 RX ADMIN — Medication 6: at 06:12

## 2024-04-17 RX ADMIN — Medication 4: at 12:33

## 2024-04-17 RX ADMIN — SODIUM CHLORIDE 250 MILLILITER(S): 9 INJECTION, SOLUTION INTRAVENOUS at 17:33

## 2024-04-17 RX ADMIN — Medication 12 UNIT(S): at 17:34

## 2024-04-17 RX ADMIN — HYDROMORPHONE HYDROCHLORIDE 6 MILLIGRAM(S): 2 INJECTION INTRAMUSCULAR; INTRAVENOUS; SUBCUTANEOUS at 01:01

## 2024-04-17 RX ADMIN — HYDROMORPHONE HYDROCHLORIDE 6 MILLIGRAM(S): 2 INJECTION INTRAMUSCULAR; INTRAVENOUS; SUBCUTANEOUS at 10:18

## 2024-04-17 RX ADMIN — SENNA PLUS 2 TABLET(S): 8.6 TABLET ORAL at 22:25

## 2024-04-17 RX ADMIN — HYDROMORPHONE HYDROCHLORIDE 6 MILLIGRAM(S): 2 INJECTION INTRAMUSCULAR; INTRAVENOUS; SUBCUTANEOUS at 13:33

## 2024-04-17 RX ADMIN — HEPARIN SODIUM 7500 UNIT(S): 5000 INJECTION INTRAVENOUS; SUBCUTANEOUS at 06:14

## 2024-04-17 RX ADMIN — CHLORHEXIDINE GLUCONATE 1 APPLICATION(S): 213 SOLUTION TOPICAL at 06:14

## 2024-04-17 NOTE — DIETITIAN INITIAL EVALUATION ADULT - PERTINENT MEDS FT
MEDICATIONS  (STANDING):  chlorhexidine 2% Cloths 1 Application(s) Topical <User Schedule>  dextrose 10% Bolus 125 milliLiter(s) IV Bolus once  dextrose 5%. 1000 milliLiter(s) (50 mL/Hr) IV Continuous <Continuous>  dextrose 5%. 1000 milliLiter(s) (100 mL/Hr) IV Continuous <Continuous>  dextrose 50% Injectable 12.5 Gram(s) IV Push once  dextrose 50% Injectable 25 Gram(s) IV Push once  folic acid 1 milliGRAM(s) Oral every 24 hours  glucagon  Injectable 1 milliGRAM(s) IntraMuscular once  heparin   Injectable 7500 Unit(s) SubCutaneous every 8 hours  HYDROmorphone  Injectable 6 milliGRAM(s) IV Push every 3 hours  hydroxyurea (Non - oncologic) 500 milliGRAM(s) Oral every 12 hours  influenza   Vaccine 0.5 milliLiter(s) IntraMuscular once  insulin glargine Injectable (LANTUS) 25 Unit(s) SubCutaneous at bedtime  insulin lispro (ADMELOG) corrective regimen sliding scale   SubCutaneous Before meals and at bedtime  insulin lispro Injectable (ADMELOG) 5 Unit(s) SubCutaneous three times a day before meals  lactated ringers. 1000 milliLiter(s) (250 mL/Hr) IV Continuous <Continuous>  senna 2 Tablet(s) Oral at bedtime    MEDICATIONS  (PRN):  acetaminophen     Tablet .. 975 milliGRAM(s) Oral every 6 hours PRN Mild Pain (1 - 3)  bisacodyl 5 milliGRAM(s) Oral every 12 hours PRN Constipation  cyclobenzaprine 5 milliGRAM(s) Oral three times a day PRN additional pain control i/s/o sickle cell crisis  dextrose Oral Gel 15 Gram(s) Oral once PRN Blood Glucose LESS THAN 70 milliGRAM(s)/deciliter  diphenhydrAMINE Injectable 50 milliGRAM(s) IV Push every 6 hours PRN Itching

## 2024-04-17 NOTE — DIETITIAN INITIAL EVALUATION ADULT - OTHER CALCULATIONS
Estimated nutritional needs determined using [facility] Standards of Nutrition Care for maintenance using ideal body weight 61.4 kg (222%IBW).

## 2024-04-17 NOTE — DIETITIAN INITIAL EVALUATION ADULT - PROBLEM SELECTOR PLAN 4
Blood glucose elevated on admission, per pt no diagnosis of T2DM, and sugar is always high during pain crises.  - follow up A1c to screen for DM  - monitor blood sugar on BMPs for now, consider adding POCT checks if blood sugar remains elevated

## 2024-04-17 NOTE — PROGRESS NOTE ADULT - ASSESSMENT
37yo F PMH sickle cell disease presenting for back, b/l hip and leg pain for the past 1 week, refractory to home treatment, found to be in sickle cell crisis, admitted for pain management/control and IV hydration. Course c/b hypoxia, acutely worsening SHERWIN, new diagnosis of T2DM and RBBB (unclear whether old or new) stepped up to MICU.     NEURO  Mentating well, AOx3.  - BALTA    CARDS  #Tachycardia, sinus  Intermittently elevated HR to low 100s, high 90s, likely iso pain.   Normal cardiac enzymes, suggest i/s/o pain crisis. Patient chest pain free.  - control pain as below  - BALTA    PULM  #Transient hypoxia   Patient with transient episode of hypoxia to 92% on room air however now 98% on RA. Suspect etiology likely atelectesis i/s/o splinting as patient in sickle cell pain crisis. Ddx also includes acute chest syndrome -- cxr normal yesterday and patient satting well on room air so less likely. V/Q ordered by primary team for r/o PE i/s/o tachycardia, hypoxia, sickle cell disease  -incentive spirometer  -f/u VQ   - c pain control as below to prevent splinting    GI  #Nutrition  - CC diet    ENDO  #T2DM  New diagnosis per patient. She is aware she has been hyperglycemic. a1c 9.9  - c lantus 25 units qhs, c lispro 5 units pre meal  - c moderate dose sliding scale   - c fingerstick glucose goal is 100-180 mg/dL.  - f/u  TSH w/ free T4 to r/o PCOS  - monitor FS  - consistent carb diet    RENAL  #SHERWIN  On admission Cr: 4.35. Likely 2/2 pain crisis, potential pre-renal with component of ATN from sickle cell disease and intravascular volume depletion.  Pt reports her kidneys are usually affected when she has a pain crisis and her numbers increase, but that she does not have baseline kidney dysfunction. Cr typically improves with IVF per pt. Baseline Cr unknown. Cr worsening overnight with Ua/urine lytes c/w pre-renal disyfucntion   - de la torre placement and strict I and O monitoring  - Avoid nephrotoxic agents  - Continue IV fluids, LR at 250cc/hr  - Check renal US  - No indication for RRT at this time per renal  - Maintain MAP >65 for renal perfusion  - outpatient Nephrology follow up on discharge      HEME  #Sickle Cell  Occur 6x per year roughly and usually in response to stress. Pain is typically controlled at home with Dilaudid 10 mg PO q8h as needed, Hydroxyurea 500 mg BID. Also takes Folic acid 1 mg qd. Believes her pain crisis this time is 2/2 stress w/ multiple deaths in her family recently. Tried taking her home Dilaudid 10 mg PO at home without improvement. Pain crisis started a few days ago and improved slightly then worsened, which is why she came back to the ER. Has rx in [place] for Dilaudid mg. S/p Dilaudid 6 mg x3 and 4 mg x2 in the ER since arrival at 6:45pm 4/15.   CXR clear in the ER, no concern for acute chest syndrome at this time.  -cw  patient's preferred regimen which she states is 6mg IV dilaudid q3hr and IV 50 benadryl 50Q6  - continue hydroxyurea 500 mg q12h  - continue folic acid 1 mg q24h  - O2 nasal cannula PRN for SpO2 <95%  - incentive spirometry   - senna qhs and dulcolax BID PRN for constipation while on opioids  - continue  cc/hr ordered by primary team  - follow up repeat CBC, retic count, haptoglobin, LDH and sickle cell screen given inability to reach outpatient hematologist at this time  - try to obtain collateral from outpatient hematologist.    #Thrombocytosis.   Plts 427 on admission, elevated from prior ER visits earlier this week. Baseline unknown.   Per review, in sickle cell crisis plts decline initially and then rise during recovery phase. Elevated plt count may indicate pt is in recovery phase.  - no interventions, continue to monitor and manage sickle cell pain crisis as above    PPX  F: LR 250cc/hr  E: Replete as needed  N: CC diet  Code status: full code 37yo F PMH sickle cell disease presenting for back, b/l hip and leg pain for the past 1 week, refractory to home treatment, found to be in sickle cell crisis, admitted for pain management/control and IV hydration. Course c/b hypoxia, acutely worsening SHERWIN, new diagnosis of T2DM and RBBB (unclear whether old or new) stepped up to MICU.     NEURO  Mentating well, AOx3.  - BALTA    CARDS  #Tachycardia, sinus  Intermittent episodes of sinus tach when in pain, no suspicion for acs/or coronory pathology   Normal cardiac enzymes, suggest i/s/o pain crisis. Patient chest pain free.  - control pain as below  - BALTA    PULM  #Transient hypoxia   Patient with transient episode of hypoxia to 92% on room air however now 98% on RA. Suspect etiology likely atelectesis i/s/o splinting as patient in sickle cell pain crisis.   - now resolved as patient is on room air   - incentive spirometer   - VQ scan: demonstrating small matching defect at the right lung base, probably due to atelectasis since ventilation in that area is poor.  However unremarkable for acute process/emobli   - c pain control as below to prevent splinting    GI  - BALTA     ENDO  #T2DM  New diagnosis per patient. She is aware she has been hyperglycemic. a1c 9.9 with elevated blood glucose levels   - c lantus 25 units qhs, c lispro 5 units pre meal  - c moderate dose sliding scale   - c fingerstick glucose goal is 100-180 mg/dL.  - TSH WNl/ Tesosterone wnl   - f/u Endo recs   - monitor FS  - consistent carb diet    RENAL  #SHERWIN  On admission Cr: 4.35. Likely 2/2 pain crisis, potential pre-renal with component of ATN from sickle cell disease and intravascular volume depletion.  Pt reports her kidneys are usually affected when she has a pain crisis and her numbers increase, but that she does not have baseline kidney dysfunction. Cr typically improves with IVF per pt. Baseline Cr unknown. Cr worsening overnight with Ua/urine lytes c/w pre-renal dysfunction   - de la torre placement and strict I and O monitoring  - Avoid nephrotoxic agents  - Continue IV fluids, LR at 250cc/hr  - f/u repeatUNa to assess renal perfusion  - outpatient Nephrology follow up on discharge      HEME  #Sickle Cell  Occur 6x per year roughly and usually in response to stress. Pain is typically controlled at home with Dilaudid 10 mg PO q8h as needed, Hydroxyurea 500 mg BID. Also takes Folic acid 1 mg qd. Believes her pain crisis this time is 2/2 stress w/ multiple deaths in her family recently. Tried taking her home Dilaudid 10 mg PO at home without improvement. Pain crisis started a few days ago and improved slightly then worsened, which is why she came back to the ER. Has rx in [place] for Dilaudid mg. S/p Dilaudid 6 mg x3 and 4 mg x2 in the ER since arrival at 6:45pm 4/15.   CXR clear in the ER, no concern for acute chest syndrome at this time.  -cw  patient's preferred regimen which she states is 6mg IV dilaudid q3hr and IV 50 benadryl 50Q6  - continue hydroxyurea 500 mg q12h  - continue folic acid 1 mg q24h  - incentive spirometry   - senna qhs and dulcolax BID PRN for constipation while on opioids  - continue  cc/hr ordered by primary team  - Sickle cell screening test negative as is smear for dysmorphic rbc   - try to obtain collateral from outpatient hematologist.    #Thrombocytosis.   Plts 427 on admission, elevated from prior ER visits earlier this week. Baseline unknown.   Per review, in sickle cell crisis plts decline initially and then rise during recovery phase. Elevated plt count may indicate pt is in recovery phase.  - no interventions, continue to monitor and manage sickle cell pain crisis as above    PPX  F: LR 250cc/hr  E: Replete as needed  N: CC diet  Code status: full code

## 2024-04-17 NOTE — PROGRESS NOTE ADULT - SUBJECTIVE AND OBJECTIVE BOX
********* TRANSFER FROM MICU TO [facility] [[email or web address]] ***********  Hospital Course: 39yo F PMH with reprted sickle cell disease presenting for back, b/l hip and leg pain for the past 1 week, refractory to home treatment, found to be in sickle cell crisis, admitted for pain management/control and IV hydration. Course c/b hypoxia, acutely worsening SHERWIN, new diagnosis of T2DM and RBBB (unclear whether old or new) stepped up to MICU. Pain managed with  6mg IV dilaudid q3hr and IV 50 benadryl 50Q6. No concern for acute chest syndrome and hypoxia now resolved with pain being well controlled. Endo following for blood glucose augmentation.  Clinical course c/b by Sickle cell screening/blood smear negative for sickle cell morphology/HbS.     INTERVAL EVENTS: MARZENA  SUBJECTIVE: Patient was seen and examined at bedside.      VITAL SIGNS:  T(F): 99.1 (04-17-24 @ 14:48)  HR: 100 (04-17-24 @ 15:00)  BP: 160/73 (04-17-24 @ 15:00)  RR: 30 (04-17-24 @ 15:00)  SpO2: 100% (04-17-24 @ 15:00)  Wt(kg): --    PHYSICAL EXAM  GENERAL: NAD, lying in bed comfortably  HEAD:  Atraumatic, normocephalic  EYES: EOMI, PERRLA, conjunctiva and sclera clear  ENT: Moist mucous membranes  NECK: Supple, no JVD  HEART: Regular rate and rhythm, no murmurs, rubs, or gallops  LUNGS: Unlabored respirations.  Clear to auscultation bilaterally, no crackles, wheezing, or rhonchi  ABDOMEN: Soft, nontender, nondistended, +BS  EXTREMITIES: 2+ peripheral pulses bilaterally. No clubbing, cyanosis, or edema  NERVOUS SYSTEM:  A&Ox3, no focal deficits   SKIN: No rashes or lesions    MEDICATIONS  (STANDING):  chlorhexidine 2% Cloths 1 Application(s) Topical <User Schedule>  dextrose 10% Bolus 125 milliLiter(s) IV Bolus once  dextrose 5%. 1000 milliLiter(s) (50 mL/Hr) IV Continuous <Continuous>  dextrose 5%. 1000 milliLiter(s) (100 mL/Hr) IV Continuous <Continuous>  dextrose 50% Injectable 25 Gram(s) IV Push once  dextrose 50% Injectable 12.5 Gram(s) IV Push once  folic acid 1 milliGRAM(s) Oral every 24 hours  glucagon  Injectable 1 milliGRAM(s) IntraMuscular once  heparin   Injectable 7500 Unit(s) SubCutaneous every 8 hours  HYDROmorphone  Injectable 4 milliGRAM(s) IV Push every 4 hours  hydroxyurea (Non - oncologic) 500 milliGRAM(s) Oral every 12 hours  influenza   Vaccine 0.5 milliLiter(s) IntraMuscular once  insulin glargine Injectable (LANTUS) 36 Unit(s) SubCutaneous at bedtime  insulin lispro (ADMELOG) corrective regimen sliding scale   SubCutaneous Before meals and at bedtime  insulin lispro Injectable (ADMELOG) 12 Unit(s) SubCutaneous three times a day before meals  lactated ringers. 1000 milliLiter(s) (250 mL/Hr) IV Continuous <Continuous>  senna 2 Tablet(s) Oral at bedtime    MEDICATIONS  (PRN):  acetaminophen     Tablet .. 975 milliGRAM(s) Oral every 6 hours PRN Mild Pain (1 - 3)  bisacodyl 5 milliGRAM(s) Oral every 12 hours PRN Constipation  cyclobenzaprine 5 milliGRAM(s) Oral three times a day PRN additional pain control i/s/o sickle cell crisis  dextrose Oral Gel 15 Gram(s) Oral once PRN Blood Glucose LESS THAN 70 milliGRAM(s)/deciliter  diphenhydrAMINE Injectable 50 milliGRAM(s) IV Push every 6 hours PRN Itching      Allergies    No Known Allergies    Intolerances        LABS:                        8.1    10.56 )-----------( 303      ( 17 Apr 2024 05:30 )             27.4     04-17    140  |  104  |  39<H>  ----------------------------<  227<H>  4.4   |  24  |  3.81<H>    Ca    9.4      17 Apr 2024 11:34  Phos  5.9     04-17  Mg     2.1     04-17    TPro  7.5  /  Alb  3.6  /  TBili  0.2  /  DBili  x   /  AST  14  /  ALT  11  /  AlkPhos  103  04-16      Urinalysis Basic - ( 17 Apr 2024 11:34 )    Color: x / Appearance: x / SG: x / pH: x  Gluc: 227 mg/dL / Ketone: x  / Bili: x / Urobili: x   Blood: x / Protein: x / Nitrite: x   Leuk Esterase: x / RBC: x / WBC x   Sq Epi: x / Non Sq Epi: x / Bacteria: x          MICROBIOLOGY      RADIOLOGY & ADDITIONAL TESTS:  Reviewed ********* TRANSFER FROM MICU TO [facility] ***********  Hospital Course: 39yo F PMH with reprted sickle cell disease presenting for back, b/l hip and leg pain for the past 1 week, refractory to home treatment, found to be in sickle cell crisis, admitted for pain management/control and IV hydration. Course c/b hypoxia, acutely worsening SHERWIN, new diagnosis of T2DM and RBBB (unclear whether old or new) stepped up to MICU. Pain managed with  6mg IV dilaudid q3hr and IV 50 benadryl 50Q6. No concern for acute chest syndrome and hypoxia now resolved with pain being well controlled. Endo following for blood glucose augmentation.  Clinical course c/b by Sickle cell screening/blood smear negative for sickle cell morphology/HbS.     INTERVAL EVENTS: MARZENA  SUBJECTIVE: Patient was seen and examined at bedside. States that she continues to have pain in her back, hips, and legs, rated a 9/10 (unchanged from arrival). Denies CP, SOB, N/V, abd pain, diarrhea, or dysuria. Denies fever/chills, URI sx.     VITAL SIGNS:  T(F): 99.1 (04-17-24 @ 14:48)  HR: 100 (04-17-24 @ 15:00)  BP: 160/73 (04-17-24 @ 15:00)  RR: 30 (04-17-24 @ 15:00)  SpO2: 100% (04-17-24 @ 15:00)  Wt(kg): --    PHYSICAL EXAM  GENERAL: NAD, lying in bed comfortably  HEAD:  Atraumatic, normocephalic  EYES: EOMI,conjunctiva and sclera clear  ENT: Moist mucous membranes  HEART: Regular rate and rhythm, no murmurs, rubs, or gallops  LUNGS: Unlabored respirations.  Clear to auscultation bilaterally, no crackles, wheezing, or rhonchi  ABDOMEN: obese, soft, nontender, nondistended, +BS  EXTREMITIES: 2+ peripheral pulses bilaterally. No clubbing, cyanosis, or edema  NERVOUS SYSTEM:  A&Ox3, no focal deficits   SKIN: No rashes or lesions    MEDICATIONS  (STANDING):  chlorhexidine 2% Cloths 1 Application(s) Topical <User Schedule>  dextrose 10% Bolus 125 milliLiter(s) IV Bolus once  dextrose 5%. 1000 milliLiter(s) (50 mL/Hr) IV Continuous <Continuous>  dextrose 5%. 1000 milliLiter(s) (100 mL/Hr) IV Continuous <Continuous>  dextrose 50% Injectable 25 Gram(s) IV Push once  dextrose 50% Injectable 12.5 Gram(s) IV Push once  folic acid 1 milliGRAM(s) Oral every 24 hours  glucagon  Injectable 1 milliGRAM(s) IntraMuscular once  heparin   Injectable 7500 Unit(s) SubCutaneous every 8 hours  HYDROmorphone  Injectable 4 milliGRAM(s) IV Push every 4 hours  hydroxyurea (Non - oncologic) 500 milliGRAM(s) Oral every 12 hours  influenza   Vaccine 0.5 milliLiter(s) IntraMuscular once  insulin glargine Injectable (LANTUS) 36 Unit(s) SubCutaneous at bedtime  insulin lispro (ADMELOG) corrective regimen sliding scale   SubCutaneous Before meals and at bedtime  insulin lispro Injectable (ADMELOG) 12 Unit(s) SubCutaneous three times a day before meals  lactated ringers. 1000 milliLiter(s) (250 mL/Hr) IV Continuous <Continuous>  senna 2 Tablet(s) Oral at bedtime    MEDICATIONS  (PRN):  acetaminophen     Tablet .. 975 milliGRAM(s) Oral every 6 hours PRN Mild Pain (1 - 3)  bisacodyl 5 milliGRAM(s) Oral every 12 hours PRN Constipation  cyclobenzaprine 5 milliGRAM(s) Oral three times a day PRN additional pain control i/s/o sickle cell crisis  dextrose Oral Gel 15 Gram(s) Oral once PRN Blood Glucose LESS THAN 70 milliGRAM(s)/deciliter  diphenhydrAMINE Injectable 50 milliGRAM(s) IV Push every 6 hours PRN Itching      Allergies    No Known Allergies    Intolerances        LABS:                        8.1    10.56 )-----------( 303      ( 17 Apr 2024 05:30 )             27.4     04-17    140  |  104  |  39<H>  ----------------------------<  227<H>  4.4   |  24  |  3.81<H>    Ca    9.4      17 Apr 2024 11:34  Phos  5.9     04-17  Mg     2.1     04-17    TPro  7.5  /  Alb  3.6  /  TBili  0.2  /  DBili  x   /  AST  14  /  ALT  11  /  AlkPhos  103  04-16      Urinalysis Basic - ( 17 Apr 2024 11:34 )    Color: x / Appearance: x / SG: x / pH: x  Gluc: 227 mg/dL / Ketone: x  / Bili: x / Urobili: x   Blood: x / Protein: x / Nitrite: x   Leuk Esterase: x / RBC: x / WBC x   Sq Epi: x / Non Sq Epi: x / Bacteria: x          MICROBIOLOGY      RADIOLOGY & ADDITIONAL TESTS:  Reviewed

## 2024-04-17 NOTE — PROGRESS NOTE ADULT - PROBLEM SELECTOR PLAN 4
Pt w/ leukocytosis, likely reactive i/s/o pain. Low suspicion for infection. Downtrending.     - trend CBC  - monitor for signs of infection RESOLVED  Patient with transient episode of hypoxia to 92% on room air however now 98% on RA. Suspect etiology likely atelectasis i/s/o splinting as patient in sickle cell pain crisis.   - now resolved as patient is on room air   - incentive spirometer   - VQ scan: demonstrating small matching defect at the right lung base, probably due to atelectasis since ventilation in that area is poor.  However unremarkable for acute process/emobli  - TTE w/ normal systolic function  - c/w pain control as below to prevent splinting RESOLVED  Patient with transient episode of hypoxia to 92% on room air however now 98% on RA. Suspect etiology likely atelectasis i/s/o splinting as patient in sickle cell pain crisis.     - now resolved as patient is on room air   - incentive spirometer   - VQ scan: demonstrating small matching defect at the right lung base, probably due to atelectasis since ventilation in that area is poor.  However unremarkable for acute process/emobli  - TTE w/ normal systolic function  - c/w pain control as below to prevent splinting New diagnosis per patient. She is aware she has been hyperglycemic. a1c 9.9 with elevated blood glucose levels     - c/w lantus 36 units qhs, c/w lispro 12 units pre meal  - c/w moderate dose sliding scale   - c/w fingerstick glucose goal is 100-180 mg/dL.  - TSH WNl/ Tesosterone wnl   - f/u Endo recs   - monitor FS  - consistent carb diet

## 2024-04-17 NOTE — PROGRESS NOTE ADULT - ASSESSMENT
# Type 2 diabetes mellitus with hyperglycemia  - Please increase lantus 36 units at bedtime & lispro 12 units before each meal.  - Continue lispro moderate dose sliding scale before meals and at bedtime.  - Patient's fingerstick glucose goal is 100-180 mg/dL.    - Discharge recommendations to be discussed. As patient is from [place], please ensure she has accessible follow-up on discharge. She is welcome to follow up with [facility] by calling [phone] to make an appointment.     Case discussed with Dr. Porter. Primary team updated.     Service Pager: [phone]

## 2024-04-17 NOTE — PROCEDURE NOTE - NSICDXPROCEDURE_GEN_ALL_CORE_FT
PROCEDURES:  US guided vascular access 16-Apr-2024 21:26:43  Grant Fraga  Phlebotomy 16-Apr-2024 21:26:56  Grant Fraga  

## 2024-04-17 NOTE — PROGRESS NOTE ADULT - PROBLEM SELECTOR PLAN 6
F: LR 250cc/hr  E: Replete as needed  N: CC diet  DVT ppx: heparin  Dispo: RMF  Code status: full code Plts 427 on admission, elevated from prior ER visits earlier this week. Baseline unknown.   Per review, in sickle cell crisis plts decline initially and then rise during recovery phase. Elevated plt count may indicate pt is in recovery phase.  - no interventions, continue to monitor and manage sickle cell pain crisis as above RESOLVED  Plts 427 on admission, elevated from prior ER visits earlier this week. Baseline unknown.   Per review, in sickle cell crisis plts decline initially and then rise during recovery phase. Elevated plt count may indicate pt is in recovery phase.  - no interventions, continue to monitor and manage sickle cell pain crisis as above Pt w/ leukocytosis, likely reactive i/s/o pain. Low suspicion for infection. Downtrending.     - trend CBC  - monitor for signs of infection RESOLVED  Patient with transient episode of hypoxia to 92% on room air however now 98% on RA. Suspect etiology likely atelectasis i/s/o splinting as patient in sickle cell pain crisis.     - now resolved as patient is on room air   - incentive spirometer   - VQ scan: demonstrating small matching defect at the right lung base, probably due to atelectasis since ventilation in that area is poor.  However unremarkable for acute process/emobli  - TTE w/ normal systolic function  - c/w pain control as below to prevent splinting

## 2024-04-17 NOTE — PROGRESS NOTE ADULT - ATTENDING COMMENTS
I agree with the fellow's findings and plans as written above with the following additions/amendments:    Seen and examined at bedside. Not a sickler, more than likel SHERWIN on CKD from DM and dehydration, sCr downtrending with fluids, would continue to trend and continue fluids with close monitoring. Please obtain UA with rubin muñoz and p:C, renal ultrasound ot help prognosticate. Further recs as above, discussed with primary team
Sickle cell disease admitted with pain crisis c/b worsening renal failure with ATN, transient mild hypoxemia, severe hyperglycemia  physical as above  UO and creatinine are better  IV RL at 250/hr   UA and lytes c/w ischemic ATN from dehydration  pain better controlled on IV dilaudid 6 mg q 3h and benadryl 50 mg IV q 6h for resulting pruritus  VQ is negative, continue incentive spirometry  hematology f/u re question of no hemoglobin S on sickle screen
Pt seen on rounds this afternoon.  Was upgraded to the MICU because of the SHERWIN and concern about oliguria and potential worsening of the renal dysfunction.  Continues to receive IV Dilaudid for pain  PO intake has been fairly good  Glucoses remain in the 200 range on 25 Lantus (given last night) and 5 units lispro premeal  She is clearly quite insulin resistant, but not surprising given her marked obesity and possibly underlying PCOS  --To increase the Lantus to 36 units, premeal lispro to 12 units  --explained to her that her A1c level of 9.9% indicates that her current hyperglycemia does not reflect an acute process related to her pain--she has pre-existing type 2 DM  --Will likely need to be discharged on insulin, and will try to plan for this  As an addendum, we were informed by the MICU staff after we finished our visit with the pt that her smear for sickle cell was negative.
Sickle cell disease admitted with pain crisis c/b worsening renal failure with ATN, transient mild hypoxemia, severe hyperglycemia  physical as above  admit to MICU for closer urinary output monitoring with de la torre especially with fluid loading  IV RL at 250/hr  check STEPHANIE and lytes  good historian and she says her usal regimen for pain is IV dilaudid 6 mg q 3h and benadryl 50 mg IV q 6h for resulting pruritus  the PCA is giving her less than that so will adhere to her usual regimen given c/o uncontrolled pain  check VQ scan but doubt PE and RA sat is 96% currently; likely atelectasis from splinting and current bedbound state
38 year old woman with self reported history of sickle cell disease, presenting with back, bilateral hip and bilateral leg pain x 1 week.   On admission found to have ATN (Cr 4 from baseline ~2 ), new diagnosis of T2DM (A1c 9.9)   Also hypoxic at time of admission (VQ scan negative for PE) , now breathing comfortably on room air.     # ? dx of sickle cell   Of note, Hgb Electrophoresis showed 97% hemoglobin A, not consistent with either Hgb SS or sickle cell trait   Patient says she has a hematologist "Dr. Enrique Menezes" in [facility] - however unable to a hematologist in [place] by that name on [employer]. Does  not have a phone number for his office   Says that she takes dilaudid PRN at home, and that she get scripts from Dr. Menezes, however, [facility] does not show any scripts for controlled substances. Double-checked spelling of name and date of birth with patient - still no hits on [employer]   [employer] attempted to reach patient's pharmacy in [place] ("a [facility] that is now a [facility]..") however the [facility] reached did not find any patients with her name and date of birth on file.   Highly unlikely that she has sickle cell given normal electrophoresis, haptoglobin not low, unless first electrophoresis was erroneous (e.g. from a different patient ),   [ ] add on hgb electrophoresis to today's labs too ; if hgb electrophoresis negative x 2, highly unlikely that she has sickle cell   [ ] switch benadryl from IV to PO   [ ] Decrease pain regimen from IV dilaudid standing. to PO PRN ; counseled on risks of leaving AMA given ATN, and Diabetes requiring insulin (A1c 9.9%), patient able to verbalize understanding.   [ ] told patient about negative electrophoresis results, plan to titrate down opiates ; counseled on fact that in the absence of a strong indication for opiates, risks of opiates outweigh the benefits   [ ] holding hydroxyurea given uncertain benefit now (negative Hgb S screen)   [ ] f/u heme recs     # lower back pain + bilateral hip pain --- sickle cell crisis unlikely given high haptoglobin, normal electrophoresis.  [ ] f/u imaging of back and hip   also complaining of some 'tightness behind knees' [ ] check LE dopplers.   # Low grade temps  - check rectal temp if peripheral temp >99F    # ATN   casts on UA   -Possible pre-renal with progression to ATN ; likely some dehydration from glucosuria  Good appetite (was asking for extra chicken fingers for dinner ) . decreased IV fluids to 150ml /hr . f/u nephrology recs ; f/u AM cystatin C     # T2DM - inpatient insulin regimen per endocrine consult   Meds to beds for discharge insulin regimen     # Anemia - etiology unclear. possible CKD related. f/u iron studies, B12 , folate  [ ] f/u heme recs

## 2024-04-17 NOTE — PROGRESS NOTE ADULT - SUBJECTIVE AND OBJECTIVE BOX
INTERVAL HPI/OVERNIGHT EVENTS:    SUBJECTIVE: Patient seen and examined at bedside.     ROS: All negative except as listed above.    VITAL SIGNS:  ICU Vital Signs Last 24 Hrs  T(C): 36.8 (2024 02:23), Max: 36.8 (2024 02:23)  T(F): 98.2 (2024 02:23), Max: 98.2 (2024 02:23)  HR: 95 (2024 06:00) (92 - 102)  BP: 153/85 (2024 06:00) (128/87 - 165/77)  BP(mean): 112 (2024 06:00) (91 - 119)  ABP: --  ABP(mean): --  RR: 25 (2024 06:00) (11 - 35)  SpO2: 99% (2024 06:00) (93% - 100%)    O2 Parameters below as of 2024 06:00  Patient On (Oxygen Delivery Method): nasal cannula  O2 Flow (L/min): 3          Plateau pressure:   P/F ratio:     04-16 @ 07:01  -  04-17 @ 06:20  --------------------------------------------------------  IN: 2700 mL / OUT: 495 mL / NET: 2205 mL      CAPILLARY BLOOD GLUCOSE      POCT Blood Glucose.: 298 mg/dL (2024 05:19)      ECG: reviewed.    PHYSICAL EXAM:    GENERAL: NAD, lying in bed comfortably  HEAD:  Atraumatic, normocephalic  EYES: EOMI, PERRLA, conjunctiva and sclera clear  NECK: Supple, trachea midline, no JVD  HEART: Regular rate and rhythm, no murmurs, rubs, or gallops  LUNGS: Unlabored respirations.  Clear to auscultation bilaterally, no crackles, wheezing, or rhonchi  ABDOMEN: Soft, nontender, nondistended, +BS  EXTREMITIES: 2+ peripheral pulses bilaterally, cap refill<2 secs. No clubbing, cyanosis, or edema  NERVOUS SYSTEM:  A&Ox3, following commands, moving all extremities, no focal deficits   SKIN: No rashes or lesions    MEDICATIONS:  MEDICATIONS  (STANDING):  chlorhexidine 2% Cloths 1 Application(s) Topical <User Schedule>  dextrose 10% Bolus 125 milliLiter(s) IV Bolus once  dextrose 5%. 1000 milliLiter(s) (50 mL/Hr) IV Continuous <Continuous>  dextrose 5%. 1000 milliLiter(s) (100 mL/Hr) IV Continuous <Continuous>  dextrose 50% Injectable 25 Gram(s) IV Push once  dextrose 50% Injectable 12.5 Gram(s) IV Push once  folic acid 1 milliGRAM(s) Oral every 24 hours  glucagon  Injectable 1 milliGRAM(s) IntraMuscular once  heparin   Injectable 7500 Unit(s) SubCutaneous every 8 hours  HYDROmorphone  Injectable 6 milliGRAM(s) IV Push every 3 hours  hydroxyurea (Non - oncologic) 500 milliGRAM(s) Oral every 12 hours  influenza   Vaccine 0.5 milliLiter(s) IntraMuscular once  insulin glargine Injectable (LANTUS) 25 Unit(s) SubCutaneous at bedtime  insulin lispro (ADMELOG) corrective regimen sliding scale   SubCutaneous Before meals and at bedtime  insulin lispro Injectable (ADMELOG) 5 Unit(s) SubCutaneous three times a day before meals  lactated ringers. 1000 milliLiter(s) (250 mL/Hr) IV Continuous <Continuous>  senna 2 Tablet(s) Oral at bedtime    MEDICATIONS  (PRN):  acetaminophen     Tablet .. 975 milliGRAM(s) Oral every 6 hours PRN Mild Pain (1 - 3)  bisacodyl 5 milliGRAM(s) Oral every 12 hours PRN Constipation  cyclobenzaprine 5 milliGRAM(s) Oral three times a day PRN additional pain control i/s/o sickle cell crisis  dextrose Oral Gel 15 Gram(s) Oral once PRN Blood Glucose LESS THAN 70 milliGRAM(s)/deciliter  diphenhydrAMINE Injectable 50 milliGRAM(s) IV Push every 6 hours PRN Itching      ALLERGIES:  Allergies    No Known Allergies    Intolerances        LABS:                        8.1    10.56 )-----------( 303      ( 2024 05:30 )             27.4     04-16    134<L>  |  98  |  37<H>  ----------------------------<  270<H>  4.2   |  24  |  4.90<H>    Ca    9.6      2024 21:24  Phos  5.6     04-16  Mg     2.2     04-16    TPro  7.5  /  Alb  3.6  /  TBili  0.2  /  DBili  x   /  AST  14  /  ALT  11  /  AlkPhos  103  04-16      Urinalysis Basic - ( 2024 22:52 )    Color: Yellow / Appearance: Clear / S.021 / pH: x  Gluc: x / Ketone: Trace mg/dL  / Bili: Negative / Urobili: 0.2 mg/dL   Blood: x / Protein: 100 mg/dL / Nitrite: Negative   Leuk Esterase: Negative / RBC: x / WBC x   Sq Epi: x / Non Sq Epi: x / Bacteria: x      ABG:      vBG:    Micro:        RADIOLOGY & ADDITIONAL TESTS: Reviewed. Hospital Course: 39yo F PMH with reprted sickle cell disease presenting for back, b/l hip and leg pain for the past 1 week, refractory to home treatment, found to be in sickle cell crisis, admitted for pain management/control and IV hydration. Course c/b hypoxia, acutely worsening SHERWIN, new diagnosis of T2DM and RBBB (unclear whether old or new) stepped up to MICU. Pain managed with  6mg IV dilaudid q3hr and IV 50 benadryl 50Q6. No concern for acute chest syndrome and hypoxia now resolved with pain being well controlled. Endo following for blood glucose augmentation.  Clinical course c/b by Sickle cell screening/blood smear negative for sickle cell morphology/HbS.     SUBJECTIVE: Patient seen and examined at bedside. Patient is reporting lower back/hip pain and discomfort in bed. Did not have a restful sleep and is quite fatigued this AM. Is also concerned about her current creatinine level. States that her Hematologist is Dr. Menezes is located in [place] and that she was diagnosed with sickle cell when she was a teenager. Currently denying chest pain, SOB, abdominal pain or dysuria     ROS: All negative except as listed above.    VITAL SIGNS:  ICU Vital Signs Last 24 Hrs  T(C): 36.8 (2024 02:23), Max: 36.8 (2024 02:23)  T(F): 98.2 (2024 02:23), Max: 98.2 (2024 02:23)  HR: 95 (2024 06:00) (92 - 102)  BP: 153/85 (2024 06:00) (128/87 - 165/77)  BP(mean): 112 (2024 06:00) (91 - 119)  ABP: --  ABP(mean): --  RR: 25 (2024 06:00) (11 - 35)  SpO2: 99% (2024 06:00) (93% - 100%)    O2 Parameters below as of 2024 06:00  Patient On (Oxygen Delivery Method): nasal cannula  O2 Flow (L/min): 3          Plateau pressure:   P/F ratio:     04-16 @ 07:01  -  04-17 @ 06:20  --------------------------------------------------------  IN: 2700 mL / OUT: 495 mL / NET: 2205 mL      CAPILLARY BLOOD GLUCOSE      POCT Blood Glucose.: 298 mg/dL (2024 05:19)      ECG: reviewed.    PHYSICAL EXAM:    GENERAL: uncomfortable in bed, morbidly obese habitus   HEAD:  Atraumatic, normocephalic  EYES: EOMI, PERRLA, conjunctiva and sclera clear  NECK: Supple, trachea midline, no JVD  HEART: Regular rate and rhythm, no murmurs, rubs, or gallops  LUNGS: Unlabored respirations.  Clear to auscultation bilaterally, no crackles, wheezing, or rhonchi  ABDOMEN: Soft, nontender, nondistended, +BS  EXTREMITIES: 2+ peripheral pulses bilaterally, cap refill<2 secs. No clubbing, cyanosis, or edema  NERVOUS SYSTEM:  A&Ox3, following commands, moving all extremities, no focal deficits   SKIN: No rashes or lesions    MEDICATIONS:  MEDICATIONS  (STANDING):  chlorhexidine 2% Cloths 1 Application(s) Topical <User Schedule>  dextrose 10% Bolus 125 milliLiter(s) IV Bolus once  dextrose 5%. 1000 milliLiter(s) (50 mL/Hr) IV Continuous <Continuous>  dextrose 5%. 1000 milliLiter(s) (100 mL/Hr) IV Continuous <Continuous>  dextrose 50% Injectable 25 Gram(s) IV Push once  dextrose 50% Injectable 12.5 Gram(s) IV Push once  folic acid 1 milliGRAM(s) Oral every 24 hours  glucagon  Injectable 1 milliGRAM(s) IntraMuscular once  heparin   Injectable 7500 Unit(s) SubCutaneous every 8 hours  HYDROmorphone  Injectable 6 milliGRAM(s) IV Push every 3 hours  hydroxyurea (Non - oncologic) 500 milliGRAM(s) Oral every 12 hours  influenza   Vaccine 0.5 milliLiter(s) IntraMuscular once  insulin glargine Injectable (LANTUS) 25 Unit(s) SubCutaneous at bedtime  insulin lispro (ADMELOG) corrective regimen sliding scale   SubCutaneous Before meals and at bedtime  insulin lispro Injectable (ADMELOG) 5 Unit(s) SubCutaneous three times a day before meals  lactated ringers. 1000 milliLiter(s) (250 mL/Hr) IV Continuous <Continuous>  senna 2 Tablet(s) Oral at bedtime    MEDICATIONS  (PRN):  acetaminophen     Tablet .. 975 milliGRAM(s) Oral every 6 hours PRN Mild Pain (1 - 3)  bisacodyl 5 milliGRAM(s) Oral every 12 hours PRN Constipation  cyclobenzaprine 5 milliGRAM(s) Oral three times a day PRN additional pain control i/s/o sickle cell crisis  dextrose Oral Gel 15 Gram(s) Oral once PRN Blood Glucose LESS THAN 70 milliGRAM(s)/deciliter  diphenhydrAMINE Injectable 50 milliGRAM(s) IV Push every 6 hours PRN Itching      ALLERGIES:  Allergies    No Known Allergies    Intolerances        LABS:                        8.1    10.56 )-----------( 303      ( 2024 05:30 )             27.4     04-16    134<L>  |  98  |  37<H>  ----------------------------<  270<H>  4.2   |  24  |  4.90<H>    Ca    9.6      2024 21:24  Phos  5.6     04-16  Mg     2.2     04-16    TPro  7.5  /  Alb  3.6  /  TBili  0.2  /  DBili  x   /  AST  14  /  ALT  11  /  AlkPhos  103  04-16      Urinalysis Basic - ( 2024 22:52 )    Color: Yellow / Appearance: Clear / S.021 / pH: x  Gluc: x / Ketone: Trace mg/dL  / Bili: Negative / Urobili: 0.2 mg/dL   Blood: x / Protein: 100 mg/dL / Nitrite: Negative   Leuk Esterase: Negative / RBC: x / WBC x   Sq Epi: x / Non Sq Epi: x / Bacteria: x      ABG:      vBG:    Micro:        RADIOLOGY & ADDITIONAL TESTS: Reviewed.

## 2024-04-17 NOTE — DIETITIAN INITIAL EVALUATION ADULT - RD TO REMAIN AVAILABLE
You child was seen and evaluated for a possible trauma related injury. We reviewed the results from your visit in the emergency department. Please read the instructions provided, and if given prescriptions, take as instructed. Your chiled can take over the counter tylenol as directed needed. Apply ice and heat as needed.     Remember, you care process does not end after your visit today. Please follow-up with your doctor within 2-5 days for a follow-up check to ensure you are  improving, to see if you need any further evaluation/testing, or to evaluate for any alternate diagnoses.     If you do not have a primary care provider, call [phone] and they will be able to assist you further.     Please [facility] with [facility] as needed. The phone number is [phone].    Please return to the emergency department if you develop severe headache, difficulty breathing, chest pain, significantly worsening pain, confusion, signs of infection such as redness/swelling/fever, inability to drink due to vomiting, or if you develop any other new or concerning symptoms as these could be signs of more serious medical illness.    We hope you feel better.      Moderate Risk/yes

## 2024-04-17 NOTE — DIETITIAN INITIAL EVALUATION ADULT - ADD RECOMMEND
-Continue current diet   -Encourage good PO intake  -Honor food preferences as able; food preferences updated and relayed to kitchen   -Weekly wts  -Monitor chemistry, GI function, and skin integrity

## 2024-04-17 NOTE — PROGRESS NOTE ADULT - PROBLEM SELECTOR PLAN 5
Plts 427 on admission, elevated from prior ER visits earlier this week. Baseline unknown.   Per review, in sickle cell crisis plts decline initially and then rise during recovery phase. Elevated plt count may indicate pt is in recovery phase.  - no interventions, continue to monitor and manage sickle cell pain crisis as above Pt w/ leukocytosis, likely reactive i/s/o pain. Low suspicion for infection. Downtrending.     - trend CBC  - monitor for signs of infection RESOLVED  Patient with transient episode of hypoxia to 92% on room air however now 98% on RA. Suspect etiology likely atelectasis i/s/o splinting as patient in sickle cell pain crisis.     - now resolved as patient is on room air   - incentive spirometer   - VQ scan: demonstrating small matching defect at the right lung base, probably due to atelectasis since ventilation in that area is poor.  However unremarkable for acute process/emobli  - TTE w/ normal systolic function  - c/w pain control as below to prevent splinting Pt p/w pain in back, hips, and legs. Thought to be i/s/o sickle cell crisis, but pt hemoglobin electrophoresis negative. Possible spinal stenosis i/s/o obesity vs. sciatica.     - pain regimen as above  - f/u CT lumbar spine, CT hip

## 2024-04-17 NOTE — DIETITIAN INITIAL EVALUATION ADULT - PROBLEM SELECTOR PLAN 5
Tprotein elevated, with protein gap 5.9. Per review, sickle cell patients have elevated levels of globulin and IgM which contribute to gap. No reported history of HIV or Hep B/C.  - no interventions

## 2024-04-17 NOTE — PROGRESS NOTE ADULT - PROBLEM SELECTOR PLAN 8
F: LR 150cc/hr  E: Replete as needed  N: CC diet  DVT ppx: heparin  Dispo: F  Code status: full code RESOLVED  Plts 427 on admission, elevated from prior ER visits earlier this week. Baseline unknown.   Per review, in sickle cell crisis plts decline initially and then rise during recovery phase. Elevated plt count may indicate pt is in recovery phase.  - no interventions, continue to monitor and manage sickle cell pain crisis as above

## 2024-04-17 NOTE — DIETITIAN INITIAL EVALUATION ADULT - OTHER INFO
39yo F PMH sickle cell disease presenting for back, b/l hip and leg pain for the past 1 week, refractory to home treatment, found to be in sickle cell crisis, admitted for pain management/control and IV hydration.    Pt care discussed in interdisciplinary care team rounds. Rx and labs reviewed. Pt presents with no overt signs of nutritional wasting. Pt received sitting upright on edge of bed, alert and participatory in nutrition assessment. Pt reports a good appetite and reflective PO intake; meal tray at bedside >75% consumed. Pt reports generally good PO intake, but notes it varies when she is experiencing pain. Continues on consistent carbohydrate diet; discussed diet therapy and rationale - pt agreeable and verbalized understanding. Updated food preferences and relayed to kitchen. No complaints of nausea/vomiting/constipation/diarrhea or difficult chew/swallow. RDN will continue to reassess, intervene, and monitor as appropriate.     Pain: 8/10, generalized  GI: Abdomen non-distended/non-tender, +BS x4, last bowel movement PTA  Skin: Warm/Dry/Intact, no edema noted

## 2024-04-17 NOTE — PROGRESS NOTE ADULT - SUBJECTIVE AND OBJECTIVE BOX
Patient is a 38y Female seen and evaluated at bedside.       Meds:    acetaminophen     Tablet .. 975 every 6 hours PRN  bisacodyl 5 every 12 hours PRN  chlorhexidine 2% Cloths 1 <User Schedule>  cyclobenzaprine 5 three times a day PRN  dextrose 10% Bolus 125 once  dextrose 5%. 1000 <Continuous>  dextrose 5%. 1000 <Continuous>  dextrose 50% Injectable 25 once  dextrose 50% Injectable 12.5 once  dextrose Oral Gel 15 once PRN  diphenhydrAMINE Injectable 50 every 6 hours PRN  folic acid 1 every 24 hours  glucagon  Injectable 1 once  heparin   Injectable 7500 every 8 hours  HYDROmorphone  Injectable 6 every 3 hours  hydroxyurea (Non - oncologic) 500 every 12 hours  influenza   Vaccine 0.5 once  insulin glargine Injectable (LANTUS) 25 at bedtime  insulin lispro (ADMELOG) corrective regimen sliding scale  Before meals and at bedtime  insulin lispro Injectable (ADMELOG) 5 three times a day before meals  lactated ringers. 1000 <Continuous>  senna 2 at bedtime      T(C): , Max: 36.8 (04-17-24 @ 02:23)  T(F): , Max: 98.2 (04-17-24 @ 02:23)  HR: 102 (04-17-24 @ 08:00)  BP: 158/92 (04-17-24 @ 08:00)  BP(mean): 119 (04-17-24 @ 08:00)  RR: 17 (04-17-24 @ 08:00)  SpO2: 98% (04-17-24 @ 08:00)  Wt(kg): --    04-16 @ 07:01  -  04-17 @ 07:00  --------------------------------------------------------  IN: 2700 mL / OUT: 610 mL / NET: 2090 mL    04-17 @ 07:01  -  04-17 @ 08:58  --------------------------------------------------------  IN: 250 mL / OUT: 120 mL / NET: 130 mL          Review of Systems:  ROS negative except as per HPI      PHYSICAL EXAM:  Constitutional: NAD, obese, sitting in bed  Head: NCAT, EOMI, NC in place  Respiratory: Diminished breath sounds bilaterally, no accessory muscle use  Cardiac: Regular rate, no murmur  Gastrointestinal: abdomen soft, NT/ND  Extremities: WWP, bilateral LE edema, tender to palpation  Dermatologic: skin warm, dry and intact; no rashes visible  Neurologic: A&Ox3, moving all extremities, does have some sleepiness but awakes and answers questions appropriately    LABS:                        8.1    10.56 )-----------( 303      ( 17 Apr 2024 05:30 )             27.4     04-17    135  |  101  |  41<H>  ----------------------------<  274<H>  4.4   |  23  |  4.48<H>    Ca    9.0      17 Apr 2024 05:30  Phos  5.9     04-17  Mg     2.1     04-17    TPro  7.5  /  Alb  3.6  /  TBili  0.2  /  DBili  x   /  AST  14  /  ALT  11  /  AlkPhos  103  04-16        Urinalysis Basic - ( 17 Apr 2024 05:30 )    Color: x / Appearance: x / SG: x / pH: x  Gluc: 274 mg/dL / Ketone: x  / Bili: x / Urobili: x   Blood: x / Protein: x / Nitrite: x   Leuk Esterase: x / RBC: x / WBC x   Sq Epi: x / Non Sq Epi: x / Bacteria: x      Sodium, Random Urine: 21 mmol/L (04-16 @ 22:52)  Osmolality, Random Urine: 511 mosm/kg (04-16 @ 22:52)  Creatinine, Random Urine: 225 mg/dL (04-16 @ 22:52)  Osmolality, Random Urine: 400 mosm/kg (04-16 @ 19:17)  Sodium, Random Urine: <20 mmol/L (04-16 @ 19:13)  Creatinine, Random Urine: 295 mg/dL (04-16 @ 19:13)  Protein/Creatinine Ratio Calculation: 1.1 Ratio (04-16 @ 19:13)  Potassium, Random Urine: 56 mmol/L (04-16 @ 19:13)  Creatinine, Random Urine: 287 mg/dL (04-16 @ 19:13)  Protein/Creatinine Ratio Calculation: 1.2 Ratio (04-16 @ 19:13)        RADIOLOGY & ADDITIONAL STUDIES:           Patient is a 38y Female seen and evaluated at bedside. Sitting up at the side of bed.  States that she continues to have pain, but improving.  Overnight, POCUS by ICU revealing IVC 1 cm.  Harry was also placed.  Remains on IV fluids.  PCA pump DC'd, switch to IV pushes of pain meds.  Sickle cell screen negative.      Meds:    acetaminophen     Tablet .. 975 every 6 hours PRN  bisacodyl 5 every 12 hours PRN  chlorhexidine 2% Cloths 1 <User Schedule>  cyclobenzaprine 5 three times a day PRN  dextrose 10% Bolus 125 once  dextrose 5%. 1000 <Continuous>  dextrose 5%. 1000 <Continuous>  dextrose 50% Injectable 25 once  dextrose 50% Injectable 12.5 once  dextrose Oral Gel 15 once PRN  diphenhydrAMINE Injectable 50 every 6 hours PRN  folic acid 1 every 24 hours  glucagon  Injectable 1 once  heparin   Injectable 7500 every 8 hours  HYDROmorphone  Injectable 6 every 3 hours  hydroxyurea (Non - oncologic) 500 every 12 hours  influenza   Vaccine 0.5 once  insulin glargine Injectable (LANTUS) 25 at bedtime  insulin lispro (ADMELOG) corrective regimen sliding scale  Before meals and at bedtime  insulin lispro Injectable (ADMELOG) 5 three times a day before meals  lactated ringers. 1000 <Continuous>  senna 2 at bedtime      T(C): , Max: 36.8 (04-17-24 @ 02:23)  T(F): , Max: 98.2 (04-17-24 @ 02:23)  HR: 102 (04-17-24 @ 08:00)  BP: 158/92 (04-17-24 @ 08:00)  BP(mean): 119 (04-17-24 @ 08:00)  RR: 17 (04-17-24 @ 08:00)  SpO2: 98% (04-17-24 @ 08:00)  Wt(kg): --    04-16 @ 07:01  -  04-17 @ 07:00  --------------------------------------------------------  IN: 2700 mL / OUT: 610 mL / NET: 2090 mL    04-17 @ 07:01 - 04-17 @ 08:58  --------------------------------------------------------  IN: 250 mL / OUT: 120 mL / NET: 130 mL          Review of Systems:  ROS negative except as per HPI      PHYSICAL EXAM:  Constitutional: NAD, obese, sitting in bed  Head: NCAT, EOMI, NC in place  Respiratory: Diminished breath sounds bases bilaterally no accessory muscle use  Cardiac: Regular rate, no murmur  Gastrointestinal: abdomen soft, NT/ND  Extremities: WWP, bilateral LE edema, tender to palpation  Dermatologic: skin warm, dry and intact; no rashes visible  Neurologic: A&Ox3, moving all extremities, does have some sleepiness but awakes and answers questions appropriately    LABS:                        8.1    10.56 )-----------( 303      ( 17 Apr 2024 05:30 )             27.4     04-17    135  |  101  |  41<H>  ----------------------------<  274<H>  4.4   |  23  |  4.48<H>    Ca    9.0      17 Apr 2024 05:30  Phos  5.9     04-17  Mg     2.1     04-17    TPro  7.5  /  Alb  3.6  /  TBili  0.2  /  DBili  x   /  AST  14  /  ALT  11  /  AlkPhos  103  04-16        Urinalysis Basic - ( 17 Apr 2024 05:30 )    Color: x / Appearance: x / SG: x / pH: x  Gluc: 274 mg/dL / Ketone: x  / Bili: x / Urobili: x   Blood: x / Protein: x / Nitrite: x   Leuk Esterase: x / RBC: x / WBC x   Sq Epi: x / Non Sq Epi: x / Bacteria: x      Sodium, Random Urine: 21 mmol/L (04-16 @ 22:52)  Osmolality, Random Urine: 511 mosm/kg (04-16 @ 22:52)  Creatinine, Random Urine: 225 mg/dL (04-16 @ 22:52)  Osmolality, Random Urine: 400 mosm/kg (04-16 @ 19:17)  Sodium, Random Urine: <20 mmol/L (04-16 @ 19:13)  Creatinine, Random Urine: 295 mg/dL (04-16 @ 19:13)  Protein/Creatinine Ratio Calculation: 1.1 Ratio (04-16 @ 19:13)  Potassium, Random Urine: 56 mmol/L (04-16 @ 19:13)  Creatinine, Random Urine: 287 mg/dL (04-16 @ 19:13)  Protein/Creatinine Ratio Calculation: 1.2 Ratio (04-16 @ 19:13)        RADIOLOGY & ADDITIONAL STUDIES:           Patient is a 38y Female seen and evaluated at bedside. Sitting up at the side of bed.  States that she continues to have pain, but improving.  Overnight, POCUS by ICU revealing IVC 1 cm.  Harry was also placed.  Remains on IV fluids.  PCA pump DC'd, switch to IV pushes of pain meds.  Sickle cell screen negative.      Meds:    acetaminophen     Tablet .. 975 every 6 hours PRN  bisacodyl 5 every 12 hours PRN  chlorhexidine 2% Cloths 1 <User Schedule>  cyclobenzaprine 5 three times a day PRN  dextrose 10% Bolus 125 once  dextrose 5%. 1000 <Continuous>  dextrose 5%. 1000 <Continuous>  dextrose 50% Injectable 25 once  dextrose 50% Injectable 12.5 once  dextrose Oral Gel 15 once PRN  diphenhydrAMINE Injectable 50 every 6 hours PRN  folic acid 1 every 24 hours  glucagon  Injectable 1 once  heparin   Injectable 7500 every 8 hours  HYDROmorphone  Injectable 6 every 3 hours  hydroxyurea (Non - oncologic) 500 every 12 hours  influenza   Vaccine 0.5 once  insulin glargine Injectable (LANTUS) 25 at bedtime  insulin lispro (ADMELOG) corrective regimen sliding scale  Before meals and at bedtime  insulin lispro Injectable (ADMELOG) 5 three times a day before meals  lactated ringers. 1000 <Continuous>  senna 2 at bedtime      T(C): , Max: 36.8 (04-17-24 @ 02:23)  T(F): , Max: 98.2 (04-17-24 @ 02:23)  HR: 102 (04-17-24 @ 08:00)  BP: 158/92 (04-17-24 @ 08:00)  BP(mean): 119 (04-17-24 @ 08:00)  RR: 17 (04-17-24 @ 08:00)  SpO2: 98% (04-17-24 @ 08:00)  Wt(kg): --    04-16 @ 07:01  -  04-17 @ 07:00  --------------------------------------------------------  IN: 2700 mL / OUT: 610 mL / NET: 2090 mL    04-17 @ 07:01 - 04-17 @ 08:58  --------------------------------------------------------  IN: 250 mL / OUT: 120 mL / NET: 130 mL          Review of Systems:  ROS negative except as per HPI      PHYSICAL EXAM:  Constitutional: NAD, obese, sitting in bed  Head: NCAT, EOMI, NC in place  Respiratory: Diminished breath sounds bases bilaterally no accessory muscle use  Cardiac: Regular rate, no murmur  Gastrointestinal: abdomen soft, NT/ND  Extremities: WWP, bilateral LE edema, tender to palpation  Dermatologic: skin warm, dry and intact; no rashes visible  Neurologic: A&Ox3, moving all extremities, does have some sleepiness but awakes and answers questions appropriately    LABS:                        8.1    10.56 )-----------( 303      ( 17 Apr 2024 05:30 )             27.4     04-17    135  |  101  |  41<H>  ----------------------------<  274<H>  4.4   |  23  |  4.48<H>    Ca    9.0      17 Apr 2024 05:30  Phos  5.9     04-17  Mg     2.1     04-17    TPro  7.5  /  Alb  3.6  /  TBili  0.2  /  DBili  x   /  AST  14  /  ALT  11  /  AlkPhos  103  04-16        Urinalysis Basic - ( 17 Apr 2024 05:30 )    Color: x / Appearance: x / SG: x / pH: x  Gluc: 274 mg/dL / Ketone: x  / Bili: x / Urobili: x   Blood: x / Protein: x / Nitrite: x   Leuk Esterase: x / RBC: x / WBC x   Sq Epi: x / Non Sq Epi: x / Bacteria: x      Sodium, Random Urine: 21 mmol/L (04-16 @ 22:52)  Osmolality, Random Urine: 511 mosm/kg (04-16 @ 22:52)  Creatinine, Random Urine: 225 mg/dL (04-16 @ 22:52)  Osmolality, Random Urine: 400 mosm/kg (04-16 @ 19:17)  Sodium, Random Urine: <20 mmol/L (04-16 @ 19:13)  Creatinine, Random Urine: 295 mg/dL (04-16 @ 19:13)  Protein/Creatinine Ratio Calculation: 1.1 Ratio (04-16 @ 19:13)  Potassium, Random Urine: 56 mmol/L (04-16 @ 19:13)  Creatinine, Random Urine: 287 mg/dL (04-16 @ 19:13)  Protein/Creatinine Ratio Calculation: 1.2 Ratio (04-16 @ 19:13)        RADIOLOGY & ADDITIONAL STUDIES:        Creatinine: 3.81 mg/dL (04-17-24 @ 11:34)  Creatinine: 4.48 mg/dL (04-17-24 @ 05:30)  Creatinine: 4.90 mg/dL (04-16-24 @ 21:24)  Creatinine: 4.35 mg/dL (04-16-24 @ 10:00)  Creatinine: 1.97 mg/dL (04-15-24 @ 14:42)  Creatinine: 2.14 mg/dL (04-12-24 @ 15:17)  Creatinine: 2.81 mg/dL (04-10-24 @ 23:33)

## 2024-04-17 NOTE — PROGRESS NOTE ADULT - PROBLEM SELECTOR PLAN 3
New diagnosis per patient. She is aware she has been hyperglycemic. a1c 9.9 with elevated blood glucose levels   - c/w lantus 36 units qhs, c/w lispro 12 units pre meal  - c/w moderate dose sliding scale   - c/w fingerstick glucose goal is 100-180 mg/dL.  - TSH WNl/ Tesosterone wnl   - f/u Endo recs   - monitor FS  - consistent carb diet New diagnosis per patient. She is aware she has been hyperglycemic. a1c 9.9 with elevated blood glucose levels     - c/w lantus 36 units qhs, c/w lispro 12 units pre meal  - c/w moderate dose sliding scale   - c/w fingerstick glucose goal is 100-180 mg/dL.  - TSH WNl/ Tesosterone wnl   - f/u Endo recs   - monitor FS  - consistent carb diet On admission, Cr 2-2.5, with increase to Cr: 4.35. Likely 2/2 pain crisis, potential pre-renal with component of ATN from sickle cell disease and intravascular volume depletion.  Pt reports her kidneys are usually affected when she has a pain crisis and her numbers increase, but that she does not have baseline kidney dysfunction. Cr typically improves with IVF per pt. Baseline Cr unknown. Cr worsening overnight with Ua/urine lytes c/w pre-renal dysfunction     - trend BUN/Cr  - Harry placed, d/je 4/17, TOV  - Avoid nephrotoxic agents  - Continue IV fluids, LR at 150cc/hr  - f/u nephrology recs  - f/u renal US  - outpatient Nephrology follow up on discharge

## 2024-04-17 NOTE — DIETITIAN NUTRITION RISK NOTIFICATION - TREATMENT: THE FOLLOWING DIET HAS BEEN RECOMMENDED
Diet, Regular:   Consistent Carbohydrate {Evening Snack} (CSTCHOSN) (04-16-24 @ 18:37) [Active]

## 2024-04-17 NOTE — PROCEDURE NOTE - NSPROCDETAILS_GEN_ALL_CORE
location identified, draped/prepped, sterile technique used/blood seen on insertion/dressing applied/flushes easily/secured in place
location identified, draped/prepped, sterile technique used

## 2024-04-17 NOTE — PROGRESS NOTE ADULT - ASSESSMENT
**************INCOMPLETE************        39yo F w/ PMH of sickle cell disease presenting with hip and leg pain, admitted with sickle cell pain crisis. Patient reports approx 6 pain crises per year. Visiting [place] from [place] for a . States that she has had prior SHERWIN with pain crises, but does not follow with a Nephrologist. Here started on IV fluids, oxygen, analgesics. Cr noted to rise from 1.97 on admission to 4.35 within one day. No urinary complaints reported. Also found to have new onset DM with A1C 9.9% and uncontrolled hyperglycemia. Hgb 9.6, plt 405k. Nephrology consulted for further management of SHERWIN in setting of sickle cell pain crisis.    SHERWIN in setting of pain crisis, suspect pre-renal possibly with component of ATN from sickle cell disease and intravascular volume depletion  - Continue aggressive IV fluids, on LR at 150cc/hr  - Supplemental oxygen  - PRN analgesia  - Please send full urine studies - UA, urine prot/creat, urine lytes  - Check renal US  - No indication for RRT at this time  - Monitor UOP, needs accurate outs  - Further management of sickle cell crisis per primary team  - Endocrinology following for diabetes  - Avoid nephrotoxic meds  - Maintain MAP >65 for renal perfusion  - Will need outpatient Nephrology follow up on discharge    ADDENDUM:  Strict I/Os, monitoring sedation with narcan available, at least BID lytes, frequent volume checks to ensure no worsening fluid overload 37yo F w/ PMH of sickle cell disease presenting with hip and leg pain, admitted with sickle cell pain crisis. Patient reports approx 6 pain crises per year. States that she has had prior SHERWIN with pain crises, but does not follow with a Nephrologist. Here started on IV fluids, oxygen, analgesics. Cr noted to rise from 1.97 on admission to 4.35 within one day. Also found to have new onset DM with A1C 9.9% and uncontrolled hyperglycemia. Nephrology consulted for further management of SHERWIN in setting of sickle cell pain crisis.    #Non-oliguric SHERWIN, suspect pre-renal possibly with component of ATN from intravascular volume depletion  - Cr slight improvement, 4.9-->4.4  - Sickle cell screen noted to be negative  - Continue IV fluids as Isabel remains on the lower side, on LR at 250cc/hr  - Frequent lung checks/POCUS to ensure no vol overload with above  - Repeat Isabel to assess renal perfusion  - Check renal US  - No indication for RRT at this time  - BMP BID  - Endocrinology following for diabetes  - Strict I&Os  - Avoid nephrotoxic meds  - Maintain MAP >65 for renal perfusion  - Will need outpatient Nephrology follow up on discharge

## 2024-04-17 NOTE — DIETITIAN INITIAL EVALUATION ADULT - PROBLEM SELECTOR PLAN 2
vs CKD vs SHERWIN on CKD  Pt reports her kidneys are usually affected when she has a pain crisis and her numbers increase, but that she does not have baseline kidney dysfunction. Cr typically improves with IVF per pt. Baseline Cr unknown. Cr 1.97 on admission, lower than Cr 2.1-2.8 when she was in the ER earlier this week. Currently on standing IVF for management of sickle cell pain crisis which will also help if there is a prerenal component. Cr reassuringly downtrending from prior values earlier this week, however GFR 30s. Low utility for Ulytes given that pt received a bolus and has been on standing IVF.  - continue IVF and monitor Cr

## 2024-04-17 NOTE — PROGRESS NOTE ADULT - PROBLEM SELECTOR PLAN 2
On admission, Cr 2-2.5, with increase to Cr: 4.35. Likely 2/2 pain crisis, potential pre-renal with component of ATN from sickle cell disease and intravascular volume depletion.  Pt reports her kidneys are usually affected when she has a pain crisis and her numbers increase, but that she does not have baseline kidney dysfunction. Cr typically improves with IVF per pt. Baseline Cr unknown. Cr worsening overnight with Ua/urine lytes c/w pre-renal dysfunction   - Harry placed, d/je 4/17, TOV  - Avoid nephrotoxic agents  - Continue IV fluids, LR at 250cc/hr  - f/u nephrology recs  - f/u renal US  - outpatient Nephrology follow up on discharge On admission, Cr 2-2.5, with increase to Cr: 4.35. Likely 2/2 pain crisis, potential pre-renal with component of ATN from sickle cell disease and intravascular volume depletion.  Pt reports her kidneys are usually affected when she has a pain crisis and her numbers increase, but that she does not have baseline kidney dysfunction. Cr typically improves with IVF per pt. Baseline Cr unknown. Cr worsening overnight with Ua/urine lytes c/w pre-renal dysfunction     - trend BUN/Cr  - Harry placed, d/je 4/17, TOV  - Avoid nephrotoxic agents  - Continue IV fluids, LR at 150cc/hr  - f/u nephrology recs  - f/u renal US  - outpatient Nephrology follow up on discharge Pt w/ anemia (Hgb 9-10) from admission. Normocytic anemia. Hgb electrophoresis negative for sickle cell.     - trend CBC  - f/u iron panel, B12/folate, retic count  - maintain active T+S  - transfuse for Hgb < 7  - f/u heme/onc recs

## 2024-04-17 NOTE — PROGRESS NOTE ADULT - PROBLEM SELECTOR PLAN 9
F: LR 150cc/hr  E: Replete as needed  N: CC diet  DVT ppx: heparin  Dispo: F  Code status: full code

## 2024-04-17 NOTE — PROGRESS NOTE ADULT - PROBLEM SELECTOR PLAN 7
F: LR 250cc/hr  E: Replete as needed  N: CC diet  DVT ppx: heparin  Dispo: RMF  Code status: full code F: LR 150cc/hr  E: Replete as needed  N: CC diet  DVT ppx: heparin  Dispo: F  Code status: full code RESOLVED  Plts 427 on admission, elevated from prior ER visits earlier this week. Baseline unknown.   Per review, in sickle cell crisis plts decline initially and then rise during recovery phase. Elevated plt count may indicate pt is in recovery phase.  - no interventions, continue to monitor and manage sickle cell pain crisis as above Pt w/ leukocytosis, likely reactive i/s/o pain. Low suspicion for infection. Downtrending.     - trend CBC  - monitor for signs of infection

## 2024-04-17 NOTE — PROGRESS NOTE ADULT - PROBLEM SELECTOR PLAN 1
Pt w/ reported hx of sickle cell. States that pain crises occur 6x per year roughly and usually in response to stress. Pain is typically controlled at home with Dilaudid 10 mg PO q8h as needed, Hydroxyurea 500 mg BID. Also takes Folic acid 1 mg qd. Believes her pain crisis this time is 2/2 stress w/ multiple deaths in her family recently. Tried taking her home Dilaudid 10 mg PO at home without improvement. Pain crisis started a few days ago and improved slightly then worsened, which is why she came back to the ER. Has rx in [place] for Dilaudid mg. S/p Dilaudid 6 mg x3 and 4 mg x2 in the ER since arrival at 6:45pm 4/15.   CXR clear in the ER, no concern for acute chest syndrome at this time.  - continue hydroxyurea 500 mg q12h  - continue folic acid 1 mg q24h  - incentive spirometry   - pain regimen: acetaminophen 975 mg q6h PRN for mild, dilaudid 2g IV q4h PRN for moderate, dilaudid 4g IV q4h PRN for severe  - senna qhs and dulcolax BID PRN for constipation while on opioids  - continue  cc/hr  - Sickle cell screening test negative as is smear for dysmorphic rbc   - try to obtain collateral from outpatient hematologist #R/o sickle cell diagnosis  Pt w/ reported hx of sickle cell. States that pain crises occur 6x per year roughly and usually in response to stress. Pain is typically controlled at home with Dilaudid 10 mg PO q8h as needed, Hydroxyurea 500 mg BID. Also takes Folic acid 1 mg qd. Believes her pain crisis this time is 2/2 stress w/ multiple deaths in her family recently. Tried taking her home Dilaudid 10 mg PO at home without improvement. Pain crisis started a few days ago and improved slightly then worsened, which is why she came back to the ER. Has rx in [place] for Dilaudid mg. S/p Dilaudid 6 mg x3 and 4 mg x2 in the ER since arrival at 6:45pm 4/15. Hb electrophoresis and smear negative for sickle cells, at this time unclear if true diagnosis.   CXR clear in the ER, no concern for acute chest syndrome at this time.    - hold hydroxyurea 500 mg q12h until heme consult  - continue folic acid 1 mg q24h  - incentive spirometry   - pain regimen: acetaminophen 975 mg q6h PRN for mild, dilaudid 2g IV q4h PRN for moderate, dilaudid 4g IV q4h PRN for severe  - senna qhs and dulcolax BID PRN for constipation while on opioids  - continue  cc/hr  - Sickle cell screening test negative as is smear for dysmorphic rbc   - try to obtain collateral from outpatient hematologist  - heme/onc consult, appreciate recs #R/o sickle cell diagnosis  Pt w/ reported hx of sickle cell. States that pain crises occur 6x per year roughly and usually in response to stress. Pain is typically controlled at home with Dilaudid 10 mg PO q8h as needed, Hydroxyurea 500 mg BID. Also takes Folic acid 1 mg qd. Believes her pain crisis this time is 2/2 stress w/ multiple deaths in her family recently. Tried taking her home Dilaudid 10 mg PO at home without improvement. Pain crisis started a few days ago and improved slightly then worsened, which is why she came back to the ER. Has rx in [place] for Dilaudid mg. S/p Dilaudid 6 mg x3 and 4 mg x2 in the ER since arrival at 6:45pm 4/15. Hb electrophoresis and smear negative for sickle cells, at this time unclear if true diagnosis.   CXR clear in the ER, no concern for acute chest syndrome at this time.    - hold hydroxyurea 500 mg q12h until heme consult  - continue folic acid 1 mg q24h  - incentive spirometry   - pain regimen: acetaminophen 975 mg q6h PRN for mild, dilaudid 3mg PO q4h PRN for moderate, dilaudid 3 mg IV q4h PRN for severe, benadryl 50 mg PO q6h  - senna qhs and dulcolax BID PRN for constipation while on opioids  - continue  cc/hr  - Sickle cell screening test negative as is smear for dysmorphic rbc   - try to obtain collateral from outpatient hematologist (Dr. Enrique Menezes, heme/oncologist)  - heme/onc consult, appreciate recs #R/o sickle cell diagnosis  Pt w/ reported hx of sickle cell. States that pain crises occur 6x per year roughly and usually in response to stress. Pain is typically controlled at home with Dilaudid 10 mg PO q8h as needed, Hydroxyurea 500 mg BID. Also takes Folic acid 1 mg qd. Believes her pain crisis this time is 2/2 stress w/ multiple deaths in her family recently. Tried taking her home Dilaudid 10 mg PO at home without improvement. Pain crisis started a few days ago and improved slightly then worsened, which is why she came back to the ER. Has rx in [place] for Dilaudid mg. S/p Dilaudid 6 mg x3 and 4 mg x2 in the ER since arrival at 6:45pm 4/15. Hb electrophoresis and smear negative for sickle cells, at this time unclear if true diagnosis.   CXR clear in the ER, no concern for acute chest syndrome at this time.    - discontinue hydroxyurea 500 mg q12h until heme consult  - continue folic acid 1 mg q24h  - incentive spirometry   - pain regimen: acetaminophen 975 mg q6h PRN for mild, dilaudid 3mg PO q4h PRN for moderate, dilaudid 3 mg IV q4h PRN for severe, benadryl 50 mg PO q6h  - senna qhs and dulcolax BID PRN for constipation while on opioids  - continue  cc/hr  - Sickle cell screening test negative as is smear for dysmorphic rbc   - try to obtain collateral from outpatient hematologist (Dr. Enrique Menezes, heme/oncologist)  - heme/onc consult, appreciate recs

## 2024-04-17 NOTE — PROGRESS NOTE ADULT - TIME BILLING
Insulin adjustment
Bedside exam and interview   Reviewed vitals, labs   Discussed patient's plan of care with housestaff   Documentation of encounter

## 2024-04-17 NOTE — PROGRESS NOTE ADULT - SUBJECTIVE AND OBJECTIVE BOX
SUBJECTIVE / INTERVAL HPI: Patient was seen and examined this morning.     Overnight events: Upgraded to ICU for strict I&O monitoring iso oliguria 2/2 ATN.    CAPILLARY BLOOD GLUCOSE & INSULIN RECEIVED  444 mg/dL (04-16 @ 17:52)  441 mg/dL (04-16 @ 18:58)  230 mg/dL (04-16 @ 21:31)  298 mg/dL (04-17 @ 05:19)  268 mg/dL (04-17 @ 10:10)  244 mg/dL (04-17 @ 12:31)    REVIEW OF SYSTEMS  Constitutional:  Negative fever, chills   Cardiovascular:  Negative for chest pain or palpitations.  Respiratory:  Negative for cough, wheezing, or shortness of breath.    Gastrointestinal:  Negative for nausea, vomiting, diarrhea, constipation, or abdominal pain.  Genitourinary:  Negative frequency, urgency or dysuria.  Neurologic:  No headache, confusion, dizziness, lightheadedness.    PHYSICAL EXAM  Vital Signs Last 24 Hrs  T(C): 37.3 (17 Apr 2024 14:48), Max: 37.3 (17 Apr 2024 14:48)  T(F): 99.1 (17 Apr 2024 14:48), Max: 99.1 (17 Apr 2024 14:48)  HR: 100 (17 Apr 2024 15:00) (92 - 110)  BP: 160/73 (17 Apr 2024 15:00) (131/73 - 169/92)  BP(mean): 105 (17 Apr 2024 15:00) (91 - 120)  RR: 30 (17 Apr 2024 15:00) (11 - 35)  SpO2: 100% (17 Apr 2024 15:00) (93% - 100%)    Parameters below as of 17 Apr 2024 14:00  Patient On (Oxygen Delivery Method): room air    Constitutional: Awake, alert, obese  HEENT: Normocephalic, atraumatic, CRISTY, hirtuism  Neck: supple, 4x4cm hyperpigmentation on anterior neck ?acanthosis, no thyromegaly or palpable thyroid nodules.  Respiratory: Lungs clear to ausculation bilaterally.   Cardiovascular: regular rhythm, normal S1 and S2, no audible murmurs.   GI: soft, non-tender, non-distended, bowel sounds present  Extremities: No lower extremity edema, peripheral pulses present.     LABS  CBC - WBC/HGB/HTC/PLT: 10.56/8.1/27.4/303 (04-17-24)  BMP - Na/K/Cl/Bicarb/BUN/Cr/Gluc/AG/eGFR: 140/4.4/104/24/39/3.81/227/12/15 (04-17-24)  Ca - 9.4 (04-17-24)  Phos - -- (04-17-24)  Mg - -- (04-17-24)  LFT - Alb/Tprot/Tbili/Dbili/AlkPhos/ALT/AST: 3.6/--/0.2/--/103/11/14 (04-16-24)    Thyroid Stimulating Hormone, Serum: 1.690 (04-17-24)  Total T4/Free T4: --/1.500 (04-17-24)    04-16-24 @ 07:01  -  04-17-24 @ 07:00  --------------------------------------------------------  IN: 2700 mL / OUT: 610 mL / NET: 2090 mL    04-17-24 @ 07:01  -  04-17-24 @ 17:03  --------------------------------------------------------  IN: 2000 mL / OUT: 845 mL / NET: 1155 mL        MEDICATIONS  MEDICATIONS  (STANDING):  chlorhexidine 2% Cloths 1 Application(s) Topical <User Schedule>  dextrose 10% Bolus 125 milliLiter(s) IV Bolus once  dextrose 5%. 1000 milliLiter(s) (50 mL/Hr) IV Continuous <Continuous>  dextrose 5%. 1000 milliLiter(s) (100 mL/Hr) IV Continuous <Continuous>  dextrose 50% Injectable 25 Gram(s) IV Push once  dextrose 50% Injectable 12.5 Gram(s) IV Push once  folic acid 1 milliGRAM(s) Oral every 24 hours  glucagon  Injectable 1 milliGRAM(s) IntraMuscular once  heparin   Injectable 7500 Unit(s) SubCutaneous every 8 hours  HYDROmorphone  Injectable 4 milliGRAM(s) IV Push every 4 hours  hydroxyurea (Non - oncologic) 500 milliGRAM(s) Oral every 12 hours  influenza   Vaccine 0.5 milliLiter(s) IntraMuscular once  insulin glargine Injectable (LANTUS) 36 Unit(s) SubCutaneous at bedtime  insulin lispro (ADMELOG) corrective regimen sliding scale   SubCutaneous Before meals and at bedtime  insulin lispro Injectable (ADMELOG) 12 Unit(s) SubCutaneous three times a day before meals  lactated ringers. 1000 milliLiter(s) (250 mL/Hr) IV Continuous <Continuous>  senna 2 Tablet(s) Oral at bedtime    MEDICATIONS  (PRN):  acetaminophen     Tablet .. 975 milliGRAM(s) Oral every 6 hours PRN Mild Pain (1 - 3)  bisacodyl 5 milliGRAM(s) Oral every 12 hours PRN Constipation  cyclobenzaprine 5 milliGRAM(s) Oral three times a day PRN additional pain control i/s/o sickle cell crisis  dextrose Oral Gel 15 Gram(s) Oral once PRN Blood Glucose LESS THAN 70 milliGRAM(s)/deciliter  diphenhydrAMINE Injectable 50 milliGRAM(s) IV Push every 6 hours PRN Itching    ASSESSMENT / RECOMMENDATIONS    A1C: 9.9 %  BUN: 39  Creatinine: 3.81  GFR: 15  Weight: 136.1  BMI: 47

## 2024-04-18 VITALS
DIASTOLIC BLOOD PRESSURE: 90 MMHG | TEMPERATURE: 98 F | SYSTOLIC BLOOD PRESSURE: 163 MMHG | HEART RATE: 94 BPM | OXYGEN SATURATION: 100 % | RESPIRATION RATE: 17 BRPM

## 2024-04-18 DIAGNOSIS — N17.0 ACUTE KIDNEY FAILURE WITH TUBULAR NECROSIS: ICD-10-CM

## 2024-04-18 PROCEDURE — 85027 COMPLETE CBC AUTOMATED: CPT

## 2024-04-18 PROCEDURE — 84702 CHORIONIC GONADOTROPIN TEST: CPT

## 2024-04-18 PROCEDURE — 86900 BLOOD TYPING SEROLOGIC ABO: CPT

## 2024-04-18 PROCEDURE — 85045 AUTOMATED RETICULOCYTE COUNT: CPT

## 2024-04-18 PROCEDURE — 82570 ASSAY OF URINE CREATININE: CPT

## 2024-04-18 PROCEDURE — 83880 ASSAY OF NATRIURETIC PEPTIDE: CPT

## 2024-04-18 PROCEDURE — 85660 RBC SICKLE CELL TEST: CPT

## 2024-04-18 PROCEDURE — 83735 ASSAY OF MAGNESIUM: CPT

## 2024-04-18 PROCEDURE — 82553 CREATINE MB FRACTION: CPT

## 2024-04-18 PROCEDURE — 84300 ASSAY OF URINE SODIUM: CPT

## 2024-04-18 PROCEDURE — 83935 ASSAY OF URINE OSMOLALITY: CPT

## 2024-04-18 PROCEDURE — 82985 ASSAY OF GLYCATED PROTEIN: CPT

## 2024-04-18 PROCEDURE — 84439 ASSAY OF FREE THYROXINE: CPT

## 2024-04-18 PROCEDURE — 85025 COMPLETE CBC W/AUTO DIFF WBC: CPT

## 2024-04-18 PROCEDURE — 71045 X-RAY EXAM CHEST 1 VIEW: CPT

## 2024-04-18 PROCEDURE — 78582 LUNG VENTILAT&PERFUS IMAGING: CPT | Mod: MC

## 2024-04-18 PROCEDURE — 84133 ASSAY OF URINE POTASSIUM: CPT

## 2024-04-18 PROCEDURE — A9567: CPT

## 2024-04-18 PROCEDURE — 36415 COLL VENOUS BLD VENIPUNCTURE: CPT

## 2024-04-18 PROCEDURE — 84100 ASSAY OF PHOSPHORUS: CPT

## 2024-04-18 PROCEDURE — 82610 CYSTATIN C: CPT

## 2024-04-18 PROCEDURE — 86901 BLOOD TYPING SEROLOGIC RH(D): CPT

## 2024-04-18 PROCEDURE — 84156 ASSAY OF PROTEIN URINE: CPT

## 2024-04-18 PROCEDURE — 84540 ASSAY OF URINE/UREA-N: CPT

## 2024-04-18 PROCEDURE — 80048 BASIC METABOLIC PNL TOTAL CA: CPT

## 2024-04-18 PROCEDURE — 83020 HEMOGLOBIN ELECTROPHORESIS: CPT

## 2024-04-18 PROCEDURE — A9540: CPT

## 2024-04-18 PROCEDURE — 84145 PROCALCITONIN (PCT): CPT

## 2024-04-18 PROCEDURE — 84484 ASSAY OF TROPONIN QUANT: CPT

## 2024-04-18 PROCEDURE — 83615 LACTATE (LD) (LDH) ENZYME: CPT

## 2024-04-18 PROCEDURE — 82962 GLUCOSE BLOOD TEST: CPT

## 2024-04-18 PROCEDURE — 99285 EMERGENCY DEPT VISIT HI MDM: CPT | Mod: 25

## 2024-04-18 PROCEDURE — 81001 URINALYSIS AUTO W/SCOPE: CPT

## 2024-04-18 PROCEDURE — C8929: CPT

## 2024-04-18 PROCEDURE — 86850 RBC ANTIBODY SCREEN: CPT

## 2024-04-18 PROCEDURE — 86140 C-REACTIVE PROTEIN: CPT

## 2024-04-18 PROCEDURE — 80053 COMPREHEN METABOLIC PANEL: CPT

## 2024-04-18 PROCEDURE — 82550 ASSAY OF CK (CPK): CPT

## 2024-04-18 PROCEDURE — 83010 ASSAY OF HAPTOGLOBIN QUANT: CPT

## 2024-04-18 PROCEDURE — 83036 HEMOGLOBIN GLYCOSYLATED A1C: CPT

## 2024-04-18 PROCEDURE — 84443 ASSAY THYROID STIM HORMONE: CPT

## 2024-04-18 PROCEDURE — 99239 HOSP IP/OBS DSCHRG MGMT >30: CPT | Mod: GC

## 2024-04-18 RX ORDER — HYDROMORPHONE HYDROCHLORIDE 2 MG/ML
2 INJECTION INTRAMUSCULAR; INTRAVENOUS; SUBCUTANEOUS EVERY 8 HOURS
Refills: 0 | Status: DISCONTINUED | OUTPATIENT
Start: 2024-04-18 | End: 2024-04-18

## 2024-04-18 RX ORDER — HYDROXYUREA 500 MG/1
15 CAPSULE ORAL
Refills: 0 | DISCHARGE

## 2024-04-18 RX ORDER — FOLIC ACID 0.8 MG
1 TABLET ORAL
Refills: 0 | DISCHARGE

## 2024-04-18 RX ORDER — HYDROMORPHONE HYDROCHLORIDE 2 MG/ML
4 INJECTION INTRAMUSCULAR; INTRAVENOUS; SUBCUTANEOUS EVERY 4 HOURS
Refills: 0 | Status: DISCONTINUED | OUTPATIENT
Start: 2024-04-18 | End: 2024-04-18

## 2024-04-18 RX ORDER — HYDROMORPHONE HYDROCHLORIDE 2 MG/ML
1 INJECTION INTRAMUSCULAR; INTRAVENOUS; SUBCUTANEOUS
Refills: 0 | DISCHARGE

## 2024-04-18 RX ORDER — ACETAMINOPHEN 500 MG
650 TABLET ORAL ONCE
Refills: 0 | Status: DISCONTINUED | OUTPATIENT
Start: 2024-04-18 | End: 2024-04-18

## 2024-04-18 RX ORDER — HYDROMORPHONE HYDROCHLORIDE 2 MG/ML
2 INJECTION INTRAMUSCULAR; INTRAVENOUS; SUBCUTANEOUS EVERY 4 HOURS
Refills: 0 | Status: DISCONTINUED | OUTPATIENT
Start: 2024-04-18 | End: 2024-04-18

## 2024-04-18 RX ORDER — ACETAMINOPHEN 500 MG
650 TABLET ORAL EVERY 6 HOURS
Refills: 0 | Status: DISCONTINUED | OUTPATIENT
Start: 2024-04-18 | End: 2024-04-18

## 2024-04-18 RX ORDER — HYDROMORPHONE HYDROCHLORIDE 2 MG/ML
4 INJECTION INTRAMUSCULAR; INTRAVENOUS; SUBCUTANEOUS EVERY 8 HOURS
Refills: 0 | Status: DISCONTINUED | OUTPATIENT
Start: 2024-04-18 | End: 2024-04-18

## 2024-04-18 RX ADMIN — HYDROMORPHONE HYDROCHLORIDE 4 MILLIGRAM(S): 2 INJECTION INTRAMUSCULAR; INTRAVENOUS; SUBCUTANEOUS at 05:10

## 2024-04-18 RX ADMIN — HYDROMORPHONE HYDROCHLORIDE 4 MILLIGRAM(S): 2 INJECTION INTRAMUSCULAR; INTRAVENOUS; SUBCUTANEOUS at 04:13

## 2024-04-18 RX ADMIN — HYDROMORPHONE HYDROCHLORIDE 3 MILLIGRAM(S): 2 INJECTION INTRAMUSCULAR; INTRAVENOUS; SUBCUTANEOUS at 00:01

## 2024-04-18 RX ADMIN — SODIUM CHLORIDE 150 MILLILITER(S): 9 INJECTION, SOLUTION INTRAVENOUS at 00:45

## 2024-04-18 NOTE — DISCHARGE NOTE PROVIDER - NSDCCPCAREPLAN_GEN_ALL_CORE_FT
PRINCIPAL DISCHARGE DIAGNOSIS  Diagnosis: Left against medical advice  Assessment and Plan of Treatment: The patient wishes to leave against medical advice.  I have discussed the risks, benefits and alternatives (including the possibility of worsening of disease, pain, permanent disability, and/or death) with the patient and his/her family (if available).  The patient voices understanding of these risks, benefits, and alternatives and still wishes to sign out against medical advice.  The patient is awake, alert, oriented  x 3 and has demonstrated capacity to refuse/direct care.  I have advised the patient that they can and should return immediately should they develop any worse/different/additional symptoms, or if they change their mind and want to continue their care.

## 2024-04-18 NOTE — DISCHARGE NOTE PROVIDER - ATTENDING DISCHARGE PHYSICAL EXAMINATION:
Patient left AMA before being examined by provider.   Refused to stay for prescriptions or d/c summary.

## 2024-04-18 NOTE — DISCHARGE NOTE PROVIDER - CARE PROVIDER_API CALL
Karen Peña)  Internal Medicine  [address]  [place] [address]  Phone: [phone]  Fax: [phone]  Follow Up Time: 2 weeks

## 2024-04-18 NOTE — DISCHARGE NOTE PROVIDER - HOSPITAL COURSE
#Discharge: do not delete    SMITA POSADA is a 39yo F PMHx with reported sickle cell disease presenting for back, b/l hip and leg pain for the past 1 week, refractory to home treatment. Initially thought to be in sickle cell crisis, admitted for pain management/control and IV hydration. Course c/b hypoxia, acutely worsening SHERWIN, new diagnosis of T2DM and RBBB (unclear whether old or new) stepped up to MICU. Pain managed with  6mg IV dilaudid q3hr and IV 50 benadryl 50Q6. No concern for acute chest syndrome and hypoxia resolved with pain being well controlled. Endo consulted for blood glucose augmentation.  Subsequently found on sickle cell screening/blood smear to be negative for sickle cell morphology/HbS. Unable to obtain collateral on sickle cell diagnosis. Pt states that her heme/oncologist is Dr. Enrique Menezes in [facility] (but unable to find online). Stated that she fills her medications at [facility] in [facility], called and they have no record of this patient. ISTOP w/o record of this patient. Surescripts with no records. Suspect that patient's name may not be accurate. She is refusing to provide identification or social security card. Emergency contacts were contacted by social work and they report not knowing this patient. She stated that she lives in [place] but has [place] address. Allegedly a . Course c/b fever overnight, at which point patient refused all work-up (including BCx, RVP, labs, CXR, UA). Pt pain regimen changed and since no longer receiving IV dilaudid, wishing to leave. Pt was explained risks and benefits of leaving and she signed AMA form.     Problem List/Main Diagnoses (system-based):   #R/o sickle cell diagnosis  Pt w/ reported hx of sickle cell. States that pain crises occur 6x per year roughly and usually in response to stress. Pain is typically controlled at home with Dilaudid 10 mg PO q8h as needed, Hydroxyurea 500 mg BID. Also takes Folic acid 1 mg qd. Believes her pain crisis this time is 2/2 stress w/ multiple deaths in her family recently. Tried taking her home Dilaudid 10 mg PO at home without improvement. Pain crisis started a few days ago and improved slightly then worsened, which is why she came back to the ER. Has rx in [place] for Dilaudid mg. S/p Dilaudid 6 mg x3 and 4 mg x2 in the ER since arrival at 6:45pm 4/15. Hb electrophoresis and smear negative for sickle cells, at this time unclear if true diagnosis. Heme/oncologist allegedly Dr. Enrique Menezes (could not find), could not corroborate. Suspect pt may be pain seeking. Wishing to leave AMA.   - discontinue hydroxyurea 500 mg q12h until heme consult  - discontinue folic acid 1 mg q24h  - incentive spirometry   - discontinue pain regimen    #Anemia   Pt w/ anemia (Hgb 9-10) from admission. Normocytic anemia. Hgb electrophoresis negative for sickle cell. Pt refused further work-up and left AMA.   - outpatient follow-up    #ATN (acute tubular necrosis)  On admission, Cr 2-2.5, with increase to Cr: 4.35. Likely 2/2 pain crisis, potential pre-renal with component of ATN from sickle cell disease and intravascular volume depletion.  Pt reports her kidneys are usually affected when she has a pain crisis and her numbers increase, but that she does not have baseline kidney dysfunction. Cr typically improves with IVF per pt. Baseline Cr unknown. Cr worsening overnight with Ua/urine lytes c/w pre-renal dysfunction. Pt left AMA prior to full work-up.   - kai Mcclellan/je 4/17, TOV  - f/u nephrology recs  - f/u renal US  - outpatient Nephrology follow up on discharge    #DM2 (diabetes mellitus, type 2).    New diagnosis per patient. She is aware she has been hyperglycemic. a1c 9.9 with elevated blood glucose levels. Pt left AMA.   - c/w lantus 36 units qhs, c/w lispro 12 units pre meal  - outpatient endo follow-up    #Back pain  Pt p/w pain in back, hips, and legs. Thought to be i/s/o sickle cell crisis, but pt hemoglobin electrophoresis negative. Possible spinal stenosis i/s/o obesity vs. sciatica. Pt left AMA prior to work-up.   - outpatient follow-up    #Acute respiratory failure with hypoxia- RESOLVED  Patient with transient episode of hypoxia to 92% on room air however now 98% on RA. Suspect etiology likely atelectasis i/s/o splinting as patient in sickle cell pain crisis.   - now resolved as patient is on room air   - incentive spirometer   - VQ scan: demonstrating small matching defect at the right lung base, probably due to atelectasis since ventilation in that area is poor.  However unremarkable for acute process/emobli  - TTE w/ normal systolic function    #Leukocytosis  Pt w/ leukocytosis, likely reactive i/s/o pain. Fever overnight to 100.5. Refusing infectious work-up. Pt left AMA prior to work-up.   - trend CBC  - monitor for signs of infection.    #Thrombocytosis - RESOLVED  Plts 427 on admission, elevated from prior ER visits earlier this week. Baseline unknown.   Per review, in sickle cell crisis plts decline initially and then rise during recovery phase. Elevated plt count may indicate pt is in recovery phase.  - no interventions, continue to monitor and manage sickle cell pain crisis as above  - Pt left AMA    Patient was discharged to: home (AMA)    New medications: none  Changes to old medications: none  Medications that were stopped: hydroxyurea, folic acid, dilaudid    Items to follow up as outpatient: PCP    Physical exam at the time of discharge: Pt left AMA      LABS & STUDIES:   #Discharge: do not delete    SMITA POSADA is a 37yo F PMHx with reported sickle cell disease presenting for back, b/l hip and leg pain for the past 1 week, refractory to home treatment. Initially thought to be in sickle cell crisis, admitted for pain management/control and IV hydration. Course c/b hypoxia, acutely worsening SHERWIN, new diagnosis of T2DM and RBBB (unclear whether old or new) stepped up to MICU. Pain managed with  6mg IV dilaudid q3hr and IV 50 benadryl 50Q6. No concern for acute chest syndrome and hypoxia resolved with pain being well controlled. Endo consulted for blood glucose augmentation.  Subsequently found on sickle cell screening/blood smear to be negative for sickle cell morphology/HbS. Unable to obtain collateral on sickle cell diagnosis. Pt states that her heme/oncologist is Dr. Enrique Menezes in [facility] (but unable to find online). Stated that she fills her medications at [facility] in [facility], called and they have no record of this patient. ISTOP w/o record of this patient. Surescripts with no records. Suspect that patient's name may not be accurate. She is refusing to provide identification or social security card. Emergency contacts were contacted by social work and they report not knowing this patient. She stated that she lives in [place] but has [place] address. Allegedly a . Course c/b fever overnight, at which point patient refused all work-up (including BCx, RVP, labs, CXR, UA). Pt pain regimen changed and since no longer receiving IV dilaudid, wishing to leave. Pt was explained risks and benefits of leaving and she signed AMA form. Did not wait for prescriptions or dc papers.    Problem List/Main Diagnoses (system-based):   #R/o sickle cell diagnosis  Pt w/ reported hx of sickle cell. States that pain crises occur 6x per year roughly and usually in response to stress. Pain is typically controlled at home with Dilaudid 10 mg PO q8h as needed, Hydroxyurea 500 mg BID. Also takes Folic acid 1 mg qd. Believes her pain crisis this time is 2/2 stress w/ multiple deaths in her family recently. Tried taking her home Dilaudid 10 mg PO at home without improvement. Pain crisis started a few days ago and improved slightly then worsened, which is why she came back to the ER. Has rx in [place] for Dilaudid mg. S/p Dilaudid 6 mg x3 and 4 mg x2 in the ER since arrival at 6:45pm 4/15. Hb electrophoresis and smear negative for sickle cells, at this time unclear if true diagnosis. Heme/oncologist allegedly Dr. Enrique Menezes (could not find), could not corroborate. Suspect pt may be pain seeking. Wishing to leave AMA.   - discontinue hydroxyurea 500 mg q12h until heme consult  - discontinue folic acid 1 mg q24h  - incentive spirometry   - discontinue pain regimen    #Anemia   Pt w/ anemia (Hgb 9-10) from admission. Normocytic anemia. Hgb electrophoresis negative for sickle cell. Pt refused further work-up and left AMA.   - outpatient follow-up    #ATN (acute tubular necrosis)  On admission, Cr 2-2.5, with increase to Cr: 4.35. Likely 2/2 pain crisis, potential pre-renal with component of ATN from sickle cell disease and intravascular volume depletion.  Pt reports her kidneys are usually affected when she has a pain crisis and her numbers increase, but that she does not have baseline kidney dysfunction. Cr typically improves with IVF per pt. Baseline Cr unknown. Cr worsening overnight with Ua/urine lytes c/w pre-renal dysfunction. Pt left AMA prior to full work-up.   - kai Mcclellan/je 4/17, TOV  - f/u nephrology recs  - f/u renal US  - outpatient Nephrology follow up on discharge    #DM2 (diabetes mellitus, type 2).    New diagnosis per patient. She is aware she has been hyperglycemic. a1c 9.9 with elevated blood glucose levels. Pt left AMA.   - c/w lantus 36 units qhs, c/w lispro 12 units pre meal  - outpatient endo follow-up    #Back pain  Pt p/w pain in back, hips, and legs. Thought to be i/s/o sickle cell crisis, but pt hemoglobin electrophoresis negative. Possible spinal stenosis i/s/o obesity vs. sciatica. Pt left AMA prior to work-up.   - outpatient follow-up    #Acute respiratory failure with hypoxia- RESOLVED  Patient with transient episode of hypoxia to 92% on room air however now 98% on RA. Suspect etiology likely atelectasis i/s/o splinting as patient in sickle cell pain crisis.   - now resolved as patient is on room air   - incentive spirometer   - VQ scan: demonstrating small matching defect at the right lung base, probably due to atelectasis since ventilation in that area is poor.  However unremarkable for acute process/emobli  - TTE w/ normal systolic function    #Leukocytosis  Pt w/ leukocytosis, likely reactive i/s/o pain. Fever overnight to 100.5. Refusing infectious work-up. Pt left AMA prior to work-up.   - trend CBC  - monitor for signs of infection.    #Thrombocytosis - RESOLVED  Plts 427 on admission, elevated from prior ER visits earlier this week. Baseline unknown.   Per review, in sickle cell crisis plts decline initially and then rise during recovery phase. Elevated plt count may indicate pt is in recovery phase.  - no interventions, continue to monitor and manage sickle cell pain crisis as above  - Pt left AMA    Patient was discharged to: home (AMA)    New medications: none  Changes to old medications: none  Medications that were stopped: hydroxyurea, folic acid, dilaudid    Items to follow up as outpatient: PCP    Physical exam at the time of discharge: Pt left AMA      LABS & STUDIES:

## 2024-04-18 NOTE — PROGRESS NOTE ADULT - SUBJECTIVE AND OBJECTIVE BOX
OVERNIGHT EVENTS:  SUBJECTIVE: Patient was seen and examined at bedside.      VITAL SIGNS:  T(F): 98.3 (04-18-24 @ 05:51)  HR: 94 (04-18-24 @ 05:51)  BP: 163/90 (04-18-24 @ 05:51)  RR: 17 (04-18-24 @ 05:51)  SpO2: 100% (04-18-24 @ 05:51)  Wt(kg): --    PHYSICAL EXAM  GENERAL: NAD, lying in bed comfortably  HEAD:  Atraumatic, normocephalic  EYES: EOMI, PERRLA, conjunctiva and sclera clear  ENT: Moist mucous membranes  NECK: Supple, no JVD  HEART: Regular rate and rhythm, no murmurs, rubs, or gallops  LUNGS: Unlabored respirations.  Clear to auscultation bilaterally, no crackles, wheezing, or rhonchi  ABDOMEN: Soft, nontender, nondistended, +BS  EXTREMITIES: 2+ peripheral pulses bilaterally. No clubbing, cyanosis, or edema  NERVOUS SYSTEM:  A&Ox3, no focal deficits   SKIN: No rashes or lesions    MEDICATIONS  (STANDING):  dextrose 10% Bolus 125 milliLiter(s) IV Bolus once  dextrose 5%. 1000 milliLiter(s) (50 mL/Hr) IV Continuous <Continuous>  dextrose 5%. 1000 milliLiter(s) (100 mL/Hr) IV Continuous <Continuous>  dextrose 50% Injectable 25 Gram(s) IV Push once  dextrose 50% Injectable 12.5 Gram(s) IV Push once  folic acid 1 milliGRAM(s) Oral every 24 hours  glucagon  Injectable 1 milliGRAM(s) IntraMuscular once  heparin   Injectable 7500 Unit(s) SubCutaneous every 8 hours  influenza   Vaccine 0.5 milliLiter(s) IntraMuscular once  insulin glargine Injectable (LANTUS) 36 Unit(s) SubCutaneous at bedtime  insulin lispro (ADMELOG) corrective regimen sliding scale   SubCutaneous Before meals and at bedtime  insulin lispro Injectable (ADMELOG) 12 Unit(s) SubCutaneous three times a day before meals  lactated ringers. 1000 milliLiter(s) (150 mL/Hr) IV Continuous <Continuous>  senna 2 Tablet(s) Oral at bedtime    MEDICATIONS  (PRN):  acetaminophen     Tablet .. 975 milliGRAM(s) Oral every 6 hours PRN Mild Pain (1 - 3)  acetaminophen     Tablet .. 650 milliGRAM(s) Oral once PRN Temp greater or equal to 38C (100.4F), Mild Pain (1 - 3)  bisacodyl 5 milliGRAM(s) Oral every 12 hours PRN Constipation  dextrose Oral Gel 15 Gram(s) Oral once PRN Blood Glucose LESS THAN 70 milliGRAM(s)/deciliter  diphenhydrAMINE 50 milliGRAM(s) Oral every 6 hours PRN Rash and/or Itching  HYDROmorphone   Tablet 4 milliGRAM(s) Oral every 4 hours PRN Severe Pain (7 - 10)  HYDROmorphone   Tablet 2 milliGRAM(s) Oral every 4 hours PRN Moderate Pain (4 - 6)      Allergies    No Known Allergies    Intolerances        LABS:                        8.1    10.56 )-----------( 303      ( 17 Apr 2024 05:30 )             27.4     04-17    140  |  104  |  39<H>  ----------------------------<  227<H>  4.4   |  24  |  3.81<H>    Ca    9.4      17 Apr 2024 11:34  Phos  5.9     04-17  Mg     2.1     04-17    TPro  7.5  /  Alb  3.6  /  TBili  0.2  /  DBili  x   /  AST  14  /  ALT  11  /  AlkPhos  103  04-16      Urinalysis Basic - ( 17 Apr 2024 11:34 )    Color: x / Appearance: x / SG: x / pH: x  Gluc: 227 mg/dL / Ketone: x  / Bili: x / Urobili: x   Blood: x / Protein: x / Nitrite: x   Leuk Esterase: x / RBC: x / WBC x   Sq Epi: x / Non Sq Epi: x / Bacteria: x          MICROBIOLOGY      RADIOLOGY & ADDITIONAL TESTS:  Reviewed

## 2024-04-18 NOTE — CHART NOTE - NSCHARTNOTEFT_GEN_A_CORE
The patient wishes to leave against medical advice.  I have discussed the risks, benefits and alternatives (including the possibility of worsening of disease, pain, permanent disability, and/or death) with the patient.  The patient voices understanding of these risks, benefits, and alternatives and still wishes to sign out against medical advice.  The patient is awake, alert, oriented  x 3 and has demonstrated capacity to refuse/direct care. I have advised the patient that they can and should return immediately should they develop any worse/different/additional symptoms, or if they change their mind and want to continue their care.

## 2024-04-19 LAB
FRUCTOSAMINE SERPL-MCNC: 375 UMOL/L — HIGH (ref 205–285)
TESTOST FREE SERPL-MCNC: 0.4 PG/ML — SIGNIFICANT CHANGE UP (ref 0.1–6.3)

## 2024-04-24 DIAGNOSIS — E66.9 OBESITY, UNSPECIFIED: ICD-10-CM

## 2024-04-24 DIAGNOSIS — D75.839 THROMBOCYTOSIS, UNSPECIFIED: ICD-10-CM

## 2024-04-24 DIAGNOSIS — Z76.5 MALINGERER [CONSCIOUS SIMULATION]: ICD-10-CM

## 2024-04-24 DIAGNOSIS — M54.9 DORSALGIA, UNSPECIFIED: ICD-10-CM

## 2024-04-24 DIAGNOSIS — E11.65 TYPE 2 DIABETES MELLITUS WITH HYPERGLYCEMIA: ICD-10-CM

## 2024-04-24 DIAGNOSIS — D57.00 HB-SS DISEASE WITH CRISIS, UNSPECIFIED: ICD-10-CM

## 2024-04-24 DIAGNOSIS — N17.0 ACUTE KIDNEY FAILURE WITH TUBULAR NECROSIS: ICD-10-CM

## 2024-04-24 DIAGNOSIS — I45.10 UNSPECIFIED RIGHT BUNDLE-BRANCH BLOCK: ICD-10-CM

## 2024-04-24 DIAGNOSIS — D64.9 ANEMIA, UNSPECIFIED: ICD-10-CM

## 2024-04-24 DIAGNOSIS — J96.01 ACUTE RESPIRATORY FAILURE WITH HYPOXIA: ICD-10-CM

## 2024-04-24 DIAGNOSIS — J98.11 ATELECTASIS: ICD-10-CM

## 2024-06-07 NOTE — ED PROVIDER NOTE - DATE/TIME 1
Patient is requesting message be sent to Chinyere Aguilar for lab only orders vs. an E-visit.    Patient is hoping to have labs completed at [facility] over her lunch break. Routing to provider high priority due to patient request for labs today.     KD HigginsN, RN  [facility] ~ Registered Nurse  Clinic Triage  June 7, 2024    
12-Apr-2024 15:45

## 2025-02-17 NOTE — ED ADULT NURSE NOTE - NSFALLRISK_ED_ALL_ED
PT called because Alyssia indicated PT had a $0 copay and she wanted to confirm that meant she would be paying $0.0. This writer explained the process and all questions were answered.   *covering for Amadeo Subramanian MPH  Phone: [phone]  Email: [email or web address]   No
